# Patient Record
Sex: FEMALE | Race: WHITE | NOT HISPANIC OR LATINO | Employment: FULL TIME | URBAN - NONMETROPOLITAN AREA
[De-identification: names, ages, dates, MRNs, and addresses within clinical notes are randomized per-mention and may not be internally consistent; named-entity substitution may affect disease eponyms.]

---

## 2019-05-06 ENCOUNTER — OFFICE VISIT (OUTPATIENT)
Dept: URGENT CARE | Facility: PHYSICIAN GROUP | Age: 19
End: 2019-05-06

## 2019-05-06 VITALS
RESPIRATION RATE: 16 BRPM | DIASTOLIC BLOOD PRESSURE: 72 MMHG | OXYGEN SATURATION: 99 % | WEIGHT: 153 LBS | SYSTOLIC BLOOD PRESSURE: 108 MMHG | HEART RATE: 68 BPM | TEMPERATURE: 98.9 F

## 2019-05-06 DIAGNOSIS — J01.00 ACUTE MAXILLARY SINUSITIS, RECURRENCE NOT SPECIFIED: ICD-10-CM

## 2019-05-06 PROCEDURE — 99204 OFFICE O/P NEW MOD 45 MIN: CPT | Performed by: PHYSICIAN ASSISTANT

## 2019-05-06 RX ORDER — AMOXICILLIN AND CLAVULANATE POTASSIUM 875; 125 MG/1; MG/1
1 TABLET, FILM COATED ORAL 2 TIMES DAILY
Qty: 20 TAB | Refills: 0 | Status: SHIPPED | OUTPATIENT
Start: 2019-05-06 | End: 2019-07-07

## 2019-05-07 NOTE — PROGRESS NOTES
Chief Complaint   Patient presents with   • Nasal Congestion     x 1 week    • Cough   • Sinus Pain       HISTORY OF PRESENT ILLNESS: Patient is a 18 y.o. female who presents today for the following:    Cough x 1 week  + ear pain, bright green nasal drainage bilaterally - had sinus surgery January 10th; pain in both cheeks  + white sputum production  OTC meds tried: cold meds, not helping     There are no active problems to display for this patient.      Allergies:Latex    Current Outpatient Prescriptions Ordered in [facility]   Medication Sig Dispense Refill   • amoxicillin-clavulanate (AUGMENTIN) 875-125 MG Tab Take 1 Tab by mouth 2 times a day. 20 Tab 0     No current Epic-ordered facility-administered medications on file.        No past medical history on file.    Social History   Substance Use Topics   • Smoking status: Never Smoker   • Smokeless tobacco: Never Used   • Alcohol use Not on file       No family status information on file.   No family history on file.    Review of Systems:    Constitutional ROS: No unexpected change in weight, No weakness, No fatigue  Eye ROS: No recent significant change in vision, No eye pain, redness, discharge  Ear ROS: No drainage, No tinnitus or vertigo, No recent change in hearing  Mouth/Throat ROS: No teeth or gum problems, No bleeding gums, No tongue complaints  Neck ROS: No swollen glands, No significant pain in neck  Pulmonary ROS: No chronic cough, sputum, or hemoptysis, No dyspnea on exertion, No wheezing  Cardiovascular ROS: No diaphoresis, No edema, No palpitations  Gastrointestinal ROS: No change in bowel habits, No significant change in appetite, No nausea, vomiting, diarrhea, or constipation  Musculoskeletal/Extremities ROS: No peripheral edema, No pain, redness or swelling on the joints  Hematologic/Lymphatic ROS: No chills, No night sweats, No weight loss  Skin/Integumentary ROS: No edema, No evidence of rash, No itching      Exam:  /72   Pulse 68   Temp 37.2  °C (98.9 °F) (Temporal)   Resp 16   Wt 69.4 kg (153 lb)   SpO2 99%   General: Well developed, well nourished. No distress.  Eye: PERRL/EOMI; conjunctivae clear, lids normal.  ENMT: Lips without lesions, MMM. Oropharynx is clear. Bilateral TMs are within normal limits.  Pulmonary: Unlabored respiratory effort. Lungs clear to auscultation, no wheezes, no rhonchi.  Cardiovascular: Regular rate and rhythm without murmur.   Neurologic: Grossly nonfocal. No facial asymmetry noted.  Lymph: No cervical lymphadenopathy noted.  Skin: Warm, dry, good turgor. No rashes in visible areas.   Psych: Normal mood. Alert and oriented x3. Judgment and insight is normal.    Assessment/Plan:  Use all medication as directed. Discussed appropriate over-the-counter symptomatic medication, and when to return to clinic. Follow up for worsening or persistent symptoms.  1. Acute maxillary sinusitis, recurrence not specified  amoxicillin-clavulanate (AUGMENTIN) 875-125 MG Tab

## 2019-07-07 ENCOUNTER — APPOINTMENT (OUTPATIENT)
Dept: RADIOLOGY | Facility: IMAGING CENTER | Age: 19
End: 2019-07-07
Attending: PHYSICIAN ASSISTANT
Payer: MEDICAID

## 2019-07-07 ENCOUNTER — OFFICE VISIT (OUTPATIENT)
Dept: URGENT CARE | Facility: PHYSICIAN GROUP | Age: 19
End: 2019-07-07
Payer: MEDICAID

## 2019-07-07 VITALS
HEART RATE: 78 BPM | WEIGHT: 152 LBS | TEMPERATURE: 98.1 F | DIASTOLIC BLOOD PRESSURE: 76 MMHG | RESPIRATION RATE: 16 BRPM | OXYGEN SATURATION: 100 % | SYSTOLIC BLOOD PRESSURE: 118 MMHG

## 2019-07-07 DIAGNOSIS — S50.02XA CONTUSION OF LEFT ELBOW, INITIAL ENCOUNTER: ICD-10-CM

## 2019-07-07 PROCEDURE — 99214 OFFICE O/P EST MOD 30 MIN: CPT | Performed by: PHYSICIAN ASSISTANT

## 2019-07-07 PROCEDURE — 73080 X-RAY EXAM OF ELBOW: CPT | Mod: LT | Performed by: FAMILY MEDICINE

## 2019-07-07 NOTE — PROGRESS NOTES
Chief Complaint   Patient presents with   • Arm Injury     L arm can't bend it        HISTORY OF PRESENT ILLNESS: Patient is a 18 y.o. female who presents today for the following:    Patient comes in for evaluation of left elbow pain.  Her significant other was on her back last night when they both fell forward, landing on her left elbow.  She has almost full range of motion but pain with any flexion or extension.  The bulk of the pain is around the elbow joint.  She does report intermittent distal paresthesias in her left hand.  She has not taken any over-the-counter medication.  Her LMP was around a month ago.    There are no active problems to display for this patient.      Allergies:Latex    No current [employer]-ordered outpatient prescriptions on file.     No current [employer]-ordered facility-administered medications on file.        No past medical history on file.    Social History   Substance Use Topics   • Smoking status: Never Smoker   • Smokeless tobacco: Never Used   • Alcohol use Not on file       No family status information on file.   No family history on file.    Review of Systems:    Constitutional ROS: No unexpected change in weight, No weakness, No fatigue  Eye ROS: No recent significant change in vision, No eye pain, redness, discharge  Ear ROS: No drainage, No tinnitus or vertigo, No recent change in hearing  Mouth/Throat ROS: No teeth or gum problems, No bleeding gums, No tongue complaints  Neck ROS: No swollen glands, No significant pain in neck  Pulmonary ROS: No chronic cough, sputum, or hemoptysis, No dyspnea on exertion, No wheezing  Cardiovascular ROS: No diaphoresis, No edema, No palpitations  Gastrointestinal ROS: No change in bowel habits, No significant change in appetite, No nausea, vomiting, diarrhea, or constipation  Musculoskeletal/Extremities ROS: Positive for left elbow pain.  Hematologic/Lymphatic ROS: No chills, No night sweats, No weight loss  Skin/Integumentary ROS: No edema, No  evidence of rash, No itching      Exam:  /76   Pulse 78   Temp 36.7 °C (98.1 °F) (Temporal)   Resp 16   Wt 68.9 kg (152 lb)   SpO2 100%   General: Well developed, well nourished. No distress.  Pulmonary: Unlabored respiratory effort.  Extremities: Generalized tenderness of the left elbow with no obvious edema.  No ecchymosis is noted.  Almost full range of motion but pain with any range of motion.  Strong radial pulse.  Neurologic: Grossly nonfocal. No facial asymmetry noted.  Skin: Warm, dry, good turgor. No rashes in visible areas.   Psych: Normal mood. Alert and oriented x3. Judgment and insight is normal.    HCG: Unable to leave urine    X-ray, per radiology:  Impression       Negative LEFT elbow series.         Assessment/Plan:  Discussed negative x-ray with the patient.  Would consider repeating x-ray in the next 10 to 12 days if symptoms do not improve to rule out occult fracture.  Sling provided for comfort but encourage patient to do range of motion exercises throughout the day.  Recommend ibuprofen/acetaminophen as needed for pain.  Follow-up for any worsening symptoms   1. Contusion of left elbow, initial encounter  HCG QUANTITATIVE    DX-ELBOW-COMPLETE 3+ LEFT    CANCELED: DX-ELBOW-LIMITED 2- LEFT

## 2019-07-24 ENCOUNTER — NON-PROVIDER VISIT (OUTPATIENT)
Dept: URGENT CARE | Facility: PHYSICIAN GROUP | Age: 19
End: 2019-07-24

## 2019-07-24 DIAGNOSIS — Z02.1 PRE-EMPLOYMENT DRUG SCREENING: ICD-10-CM

## 2019-07-24 LAB
AMP AMPHETAMINE: NORMAL
COC COCAINE: NORMAL
INT CON NEG: NEGATIVE
INT CON POS: POSITIVE
MET METHAMPHETAMINES: NORMAL
OPI OPIATES: NORMAL
PCP PHENCYCLIDINE: NORMAL
POC DRUG COMMENT 753798-OCCUPATIONAL HEALTH: NEGATIVE
THC: NORMAL

## 2019-07-24 PROCEDURE — 80305 DRUG TEST PRSMV DIR OPT OBS: CPT | Performed by: PHYSICIAN ASSISTANT

## 2020-05-07 ENCOUNTER — OFFICE VISIT (OUTPATIENT)
Dept: URGENT CARE | Facility: PHYSICIAN GROUP | Age: 20
End: 2020-05-07
Payer: COMMERCIAL

## 2020-05-07 VITALS
RESPIRATION RATE: 18 BRPM | HEART RATE: 71 BPM | OXYGEN SATURATION: 99 % | DIASTOLIC BLOOD PRESSURE: 76 MMHG | BODY MASS INDEX: 22.22 KG/M2 | TEMPERATURE: 98.6 F | SYSTOLIC BLOOD PRESSURE: 130 MMHG | HEIGHT: 69 IN | WEIGHT: 150 LBS

## 2020-05-07 DIAGNOSIS — M54.41 ACUTE RIGHT-SIDED LOW BACK PAIN WITH RIGHT-SIDED SCIATICA: ICD-10-CM

## 2020-05-07 PROCEDURE — 99203 OFFICE O/P NEW LOW 30 MIN: CPT | Performed by: NURSE PRACTITIONER

## 2020-05-07 RX ORDER — CYCLOBENZAPRINE HCL 5 MG
5 TABLET ORAL 2 TIMES DAILY PRN
Qty: 30 TAB | Refills: 0 | Status: SHIPPED | OUTPATIENT
Start: 2020-05-07 | End: 2020-05-29

## 2020-05-07 ASSESSMENT — ENCOUNTER SYMPTOMS
BACK PAIN: 1
BOWEL INCONTINENCE: 0
LEG PAIN: 1
NECK PAIN: 0
FEVER: 0
PARESIS: 0
PERIANAL NUMBNESS: 0
SENSORY CHANGE: 0
FOCAL WEAKNESS: 0
WEAKNESS: 0
HEADACHES: 0
TINGLING: 0
PARESTHESIAS: 0
NUMBNESS: 0
CHILLS: 0
DIZZINESS: 0

## 2020-05-07 NOTE — PROGRESS NOTES
Subjective:     Nancy Nesbitt  is a 19 y.o. female who presents for Back Pain (hurt (R) lower back when her dog jerked her forward last week, pain radiates down leg )       Back Pain   This is a new problem. The current episode started in the past 7 days. The problem occurs constantly. The problem has been gradually worsening since onset. The pain is present in the lumbar spine. The quality of the pain is described as shooting. The pain radiates to the right thigh. The pain is at a severity of 7/10. The pain is severe. The pain is the same all the time. The symptoms are aggravated by position, standing, sitting and twisting. Associated symptoms include leg pain. Pertinent negatives include no bladder incontinence, bowel incontinence, fever, headaches, numbness, paresis, paresthesias, perianal numbness, tingling or weakness. (19-year-old female patient reports urgent care for new problem that started approximately 6 days ago.  Patient states she was walking her pit ball and turned to the left and her dog saw a bird and pulled her in the opposite direction.  Patient has been using heat, ice and rest with no relief of symptoms.  Patient denies any numbness or tingling or changes in bowel or bladder habits.) She has tried heat and ice for the symptoms. The treatment provided no relief.     Review of Systems   Constitutional: Negative for chills, fever and malaise/fatigue.   Gastrointestinal: Negative for bowel incontinence.   Genitourinary: Negative for bladder incontinence.   Musculoskeletal: Positive for back pain. Negative for joint pain and neck pain.   Neurological: Negative for dizziness, tingling, sensory change, focal weakness, weakness, numbness, headaches and paresthesias.     History reviewed. No pertinent past medical history. History reviewed. No pertinent surgical history.   Social History     Socioeconomic History   • Marital status: Single     Spouse name: Not on file   • Number of children: Not on  "file   • Years of education: Not on file   • Highest education level: Not on file   Occupational History   • Not on file   Social Needs   • Financial resource strain: Not on file   • Food insecurity     Worry: Not on file     Inability: Not on file   • Transportation needs     Medical: Not on file     Non-medical: Not on file   Tobacco Use   • Smoking status: Never Smoker   • Smokeless tobacco: Never Used   Substance and Sexual Activity   • Alcohol use: Not on file   • Drug use: Not on file   • Sexual activity: Not on file   Lifestyle   • Physical activity     Days per week: Not on file     Minutes per session: Not on file   • Stress: Not on file   Relationships   • Social connections     Talks on phone: Not on file     Gets together: Not on file     Attends [employer] service: Not on file     Active member of club or organization: Not on file     Attends meetings of clubs or organizations: Not on file     Relationship status: Not on file   • Intimate partner violence     Fear of current or ex partner: Not on file     Emotionally abused: Not on file     Physically abused: Not on file     Forced sexual activity: Not on file   Other Topics Concern   • Not on file   Social History Narrative   • Not on file    Latex     Objective:   /76   Pulse 71   Temp 37 °C (98.6 °F)   Resp 18   Ht 1.753 m (5' 9\")   Wt 68 kg (150 lb)   SpO2 99%   BMI 22.15 kg/m²   Physical Exam  Vitals signs reviewed.   Constitutional:       Appearance: Normal appearance.   Cardiovascular:      Rate and Rhythm: Normal rate and regular rhythm.      Heart sounds: Normal heart sounds.   Pulmonary:      Effort: Pulmonary effort is normal.      Breath sounds: Normal breath sounds.   Musculoskeletal:        Back:       Comments: Pain with dorsiflexion of right foot, positive SLR on right. Negative Fabers. No bony tenderness, step off or deformity.    Skin:     Capillary Refill: Capillary refill takes less than 2 seconds.   Neurological:      " Mental Status: She is alert and oriented to person, place, and time.   Psychiatric:         Mood and Affect: Mood normal.         Behavior: Behavior normal.         Thought Content: Thought content normal.         Judgment: Judgment normal.          Assessment/Plan:     1. Acute right-sided low back pain with right-sided sciatica  - cyclobenzaprine (FLEXERIL) 5 MG tablet; Take 1 Tab by mouth 2 times a day as needed for Moderate Pain for up to 30 doses.  Dispense: 30 Tab; Refill: 0  - Diclofenac Sodium 1 % Gel; Apply 1 Application to skin as directed 2 Times a Day for 7 days.  Dispense: 1 Tube; Refill: 0    Discussed physical examination findings as well as patient presentation and mechanism of injury is consistent with a possible muscle strain or spasm.  Provided patient with Flexeril as well as Voltaren gel to use.  Patient may additionally take over-the-counter NSAIDs and Tylenol per 's instructions use heat, ice and gentle exercises.  Instructed patient to follow-up in clinic in 7 days if symptoms persist or worsen     Supportive care, differential diagnoses, and indications for immediate follow-up discussed with patient.    Pathogenesis of diagnosis discussed including typical length and natural progression. Patient expresses understanding and agrees to plan.    Instructed patient to return to clinic for worsening symptoms or symptoms that persist for 7 to 10 days     Please note that this dictation was created using voice recognition software. I have made every reasonable attempt to correct obvious errors, but I expect that there are errors of grammar and possibly content that I did not discover before finalizing the note.

## 2020-05-26 ENCOUNTER — OCCUPATIONAL MEDICINE (OUTPATIENT)
Dept: URGENT CARE | Facility: PHYSICIAN GROUP | Age: 20
End: 2020-05-26
Payer: COMMERCIAL

## 2020-05-26 VITALS
RESPIRATION RATE: 16 BRPM | TEMPERATURE: 98.7 F | BODY MASS INDEX: 22.22 KG/M2 | SYSTOLIC BLOOD PRESSURE: 120 MMHG | OXYGEN SATURATION: 98 % | WEIGHT: 150 LBS | HEART RATE: 68 BPM | DIASTOLIC BLOOD PRESSURE: 78 MMHG | HEIGHT: 69 IN

## 2020-05-26 DIAGNOSIS — M54.16 LUMBAR RADICULOPATHY: ICD-10-CM

## 2020-05-26 DIAGNOSIS — S39.012A ACUTE MYOFASCIAL STRAIN OF LUMBAR REGION, INITIAL ENCOUNTER: ICD-10-CM

## 2020-05-26 PROCEDURE — 99214 OFFICE O/P EST MOD 30 MIN: CPT | Mod: 29 | Performed by: PHYSICIAN ASSISTANT

## 2020-05-26 NOTE — LETTER
"EMPLOYEE’S CLAIM FOR COMPENSATION/ REPORT OF INITIAL TREATMENT  FORM C-4    EMPLOYEE’S CLAIM - PROVIDE ALL INFORMATION REQUESTED   First Name  Nancy Last Name  Laz Birthdate                    2000                Sex  female Claim Number   Home Address  [address] [place] Age  19 y.o. Height  1.753 m (5' 9\") Weight  68 kg (150 lb) [facility]     [place]  [address] Telephone  [phone] (home)    Mailing Address  [address] [place] Zip  [address] Primary Language Spoken  English    Insurer   Third Party   [employer]   Employee's Occupation (Job Title) When Injury or Occupational Disease Occurred  Production    Employer's Name  [employer]  Telephone  [phone]    Employer Address  [address]  Zip  [address]    Date of Injury  5/26/2020               Hour of Injury  8:30 AM Date Employer Notified  5/26/2020 Last Day of Work after Injury or Occupational Disease  5/26/2020 Supervisor to Whom Injury Reported  Laly   Address or Location of Accident (if applicable)  [[employer] ]   What were you doing at the time of accident? (if applicable)  Pulling Picture frames down line    How did this injury or occupational disease occur? (Be specific an answer in detail. Use additional sheet if necessary)  I was pulling a picture frame down the line and routed wrong    If you believe that you have an occupational disease, when did you first have knowledge of the disability and it relationship to your employment?  N/A Witnesses to the Accident  [place]      Nature of Injury or Occupational Disease  Workers' Compensation  Part(s) of Body Injured or Affected  Lower Back Area (Lumbar Area & Lumbo-Sacral), N/A, N/A    I certify that the above is true and correct to the best of my knowledge and that I have provided this information in order to obtain the benefits of " [place]  I hereby authorize any physician, chiropractor, surgeon, practitioner, or other person, any hospital, including [facility] or [facility], any medical service organization, any insurance company, or other institution or organization to release to each other, any medical or other information, including benefits paid or payable, pertinent to this injury or disease, except information relative to diagnosis, treatment and/or counseling for AIDS, psychological conditions, alcohol or controlled substances, for which I must give specific authorization.  A Photostat of this authorization shall be as valid as the original.     Date 05/26/2020   Place [facility]   Employee’s Signature   THIS REPORT MUST BE COMPLETED AND MAILED WITHIN 3 WORKING DAYS OF TREATMENT   Place  [facility] [place]  Name of Facility  [facility]   Date  5/26/2020 Diagnosis  (S39.012A) Acute myofascial strain of lumbar region, initial encounter  (M54.16) Lumbar radiculopathy Is there evidence the injured employee was under the influence of alcohol and/or another controlled substance at the time of accident?   Hour  11:34 AM Description of Injury or Disease  Diagnoses of Acute myofascial strain of lumbar region, initial encounter and Lumbar radiculopathy were pertinent to this visit. No   Treatment  Suspect muscle strain affecting lumbar dermatome.  Discussed appropriate dosing of ibuprofen/acetaminophen as needed for pain.  May use ice, heat, and over-the-counter muscle rubs as needed for additional pain relief.  Refer to D 39 for restrictions.  Return to clinic 5/29/2020 for reevaluation, sooner for any significant changes in symptoms.  1. Acute myofascial strain of lumbar region, initial encounter    2. Lumbar radiculopathy        Have you advised the patient to remain off work five days or more? No   X-Ray  "Findings      If Yes   From Date  To Date      From information given by the employee, together with medical evidence, can you directly connect this injury or occupational disease as job incurred?  Yes If No Full Duty No Modified Duty  Yes   Is additional medical care by a physician indicated?  Yes If Modified Duty, Specify any Limitations / Restrictions  See D39   Do you know of any previous injury or disease contributing to this condition or occupational disease?                            Yes  Comments:see chart note   Date  5/26/2020 Print Doctor’s Name Marisa Dunn P.A.-C. I certify the employer’s copy of  this form was mailed on:   Address  [address] Insurer’s Use Only     [place]  [address]    Provider’s Tax ID Number  [id number] Telephone  Dept: [phone]        e-MARISA Montes De Oca P.A.-C.   e-Signature: Dr. Don Barnett,   Medical Director Degree  MD        ORIGINAL-TREATING PHYSICIAN OR CHIROPRACTOR    PAGE 2-INSURER/TPA    PAGE 3-EMPLOYER    PAGE 4-EMPLOYEE             Form C-4 (rev.10/07)              BRIEF DESCRIPTION OF RIGHTS AND BENEFITS  (Pursuant to [place] 616[place])    Notice of Injury or Occupational Disease (Incident Report Form C-1): If an injury or occupational disease (OD) arises out of and in the course of employment, you must provide written notice to your employer as soon as practicable, but no later than 7 days after the accident or OD. Your employer shall maintain a sufficient supply of the required forms.    Claim for Compensation (Form C-4): If medical treatment is sought, the form C-4 is available at the place of initial treatment. A completed \"Claim for Compensation\" (Form C-4) must be filed within 90 days after an accident or OD. The treating physician or chiropractor must, within 3 working days after treatment, complete and mail to the employer, the employer's insurer and third-party , the Claim for Compensation.    Medical " Treatment: If you require medical treatment for your on-the-job injury or OD, you may be required to select a physician or chiropractor from a list provided by your workers’ compensation insurer, if it has contracted with an Organization for Managed Care (MCO) or Preferred Provider Organization (PPO) or providers of health care. If your employer has not entered into a contract with an MCO or PPO, you may select a physician or chiropractor from the Panel of Physicians and Chiropractors. Any medical costs related to your industrial injury or OD will be paid by your insurer.    Temporary Total Disability (TTD): If your doctor has certified that you are unable to work for a period of at least 5 consecutive days, or 5 cumulative days in a 20-day period, or places restrictions on you that your employer does not accommodate, you may be entitled to TTD compensation.    Temporary Partial Disability (TPD): If the wage you receive upon reemployment is less than the compensation for TTD to which you are entitled, the insurer may be required to pay you TPD compensation to make up the difference. TPD can only be paid for a maximum of 24 months.    Permanent Partial Disability (PPD): When your medical condition is stable and there is an indication of a PPD as a result of your injury or OD, within 30 days, your insurer must arrange for an evaluation by a rating physician or chiropractor to determine the degree of your PPD. The amount of your PPD award depends on the date of injury, the results of the PPD evaluation and your age and wage.    Permanent Total Disability (PTD): If you are medically certified by a treating physician or chiropractor as permanently and totally disabled and have been granted a PTD status by your insurer, you are entitled to receive monthly benefits not to exceed 66 2/3% of your average monthly wage. The amount of your PTD payments is subject to reduction if you previously received a PPD  award.    Vocational Rehabilitation Services: You may be eligible for vocational rehabilitation services if you are unable to return to the job due to a permanent physical impairment or permanent restrictions as a result of your injury or occupational disease.    Transportation and Per Micaela Reimbursement: You may be eligible for travel expenses and per micaela associated with medical treatment.    Reopening: You may be able to reopen your claim if your condition worsens after claim closure.    Appeal Process: If you disagree with a written determination issued by the insurer or the insurer does not respond to your request, you may appeal to the Department of Administration, , by following the instructions contained in your determination letter. You must appeal the determination within 70 days from the date of the determination letter at [address], [address], [place], [place] [address], or [address], [place], [place] [address]. If you disagree with the  decision, you may appeal to the Department of Administration, . You must file your appeal within 30 days from the date of the  decision letter at [address], [address], [place], [place] [address], or [address], [place], [place] [address]. If you disagree with a decision of an , you may file a petition for judicial review with the District Court. You must do so within 30 days of the Appeal Officer’s decision. You may be represented by an  at your own expense or you may contact the [facility] for possible representation.    [place]  for Injured Workers (NAIW): If you disagree with a  decision, you may request that NAIW represent you without charge at an  Hearing. For information regarding denial of benefits, you may contact the [facility] at: [address], [address], [place] [address], [phone]  [phone], or [address], [address], [place] [address], [phone]    To File a Complaint with the Division: If you wish to file a complaint with the  of the Division of Industrial Relations (DIR),  please contact the Workers’ Compensation Section, [address], [address], [place], [place] [address], telephone (095) [phone], or [address], [address], [place], [place] [address], telephone [phone].    For assistance with Workers’ Compensation Issues: You may contact the Office of the Governor Consumer Health Assistance, [address] [place] [address], [address], [place], [place] [address], Toll Free 1-886.312.4411, Web site: http://C4M.[facility].nv., E-mail [email or web address].                   __________________________________________________________________                                                     _05/26/2020________        Employee Name / Signature                                                                                                                                              Date                                                                                                                                                                                                     D-2 (rev. 06/18)

## 2020-05-26 NOTE — PROGRESS NOTES
"Chief Complaint   Patient presents with   • Back Pain     WC new-sciatica pain pinched nerve at work again (R) side-was pulling something at a angle and felt a pop        HISTORY OF PRESENT ILLNESS: Patient is a 19 y.o. female who presents today for the following:    DOI: 5/26/2020 around 0830 hrs  JAMSHID: Reaching, pulling from her right to left, an object weight around 150-200#, felt a pop in the right low back, feeling immediate pain. Radiates sharp pain slightly up the right side of the mid back. Radiates burning pain lateral aspect right upper leg, stopping at the knee. Denies saddle anesthesia, bowel/bladder incontinence/retention, extremity weakness. OTC meds tried: none. Back history: Was evaluated 5/7/2020 for right-sided low back pain when her dog jerked her forward.  Reportedly had radiating pain to the right thigh. Pain resolved 4 days after clinic visit.    Allergies:Latex    PMH: No pertinent past medical history to this problem  MEDS: Medications were reviewed in Epic  ALLERGIES: Allergies were reviewed in Epic  SOCHX: Works at [employer]  FH: No pertinent family history to this problem    Review of Systems:    Constitutional ROS: No unexpected change in weight, No weakness, No fatigue  Musculoskeletal/Extremities ROS: right sided low back pain.  Skin/Integumentary ROS: No edema, No evidence of rash, No itching      Exam:  /78   Pulse 68   Temp 37.1 °C (98.7 °F)   Resp 16   Ht 1.753 m (5' 9\")   Wt 68 kg (150 lb)   SpO2 98%   General: Well developed, well nourished. No distress.  Pulmonary: Unlabored respiratory effort.   Back: Slight decreased range of motion in all planes due to pain.  Antalgic gait noted.  Extremities: No motor or sensory deficit noted in the bilateral lower extremities.  Strong prepatellar and Achilles DTRs noted bilaterally.  Strong plantar flexion/dorsiflexion.  Neurologic: Grossly nonfocal. No facial asymmetry noted.  Skin: Warm, dry, good turgor. No rashes in visible areas. "   Psych: Normal mood. Alert and oriented x3. Judgment and insight is normal.    Assessment/Plan:  Suspect muscle strain affecting lumbar dermatome.  Discussed appropriate dosing of ibuprofen/acetaminophen as needed for pain.  May use ice, heat, and over-the-counter muscle rubs as needed for additional pain relief.  Refer to D 39 for restrictions.  Return to clinic 5/29/2020 for reevaluation, sooner for any significant changes in symptoms.  1. Acute myofascial strain of lumbar region, initial encounter     2. Lumbar radiculopathy

## 2020-05-26 NOTE — LETTER
[address] [place] [address]  Phone:  [phone] - Fax:  [phone]   [facility] Progress Report and Disability Certification  Date of Service: 5/26/2020   No Show:  No  Date / Time of Next Visit: 5/29/2020   Claim Information   Patient Name: Nancy Nesbitt  Claim Number:     Employer: [employer]  Date of Injury: 5/26/2020     Insurer / TPA: [employer]  ID / SSN:     Occupation: Production  Diagnosis: Diagnoses of Acute myofascial strain of lumbar region, initial encounter and Lumbar radiculopathy were pertinent to this visit.    Medical Information   Related to Industrial Injury? Yes    Subjective Complaints:  DOI: 5/26/2020 around 0830 hrs  JAMSHID: Reaching, pulling from her right to left, an object weight around 150-200#, felt a pop in the right low back, feeling immediate pain. Radiates sharp pain slightly up the right side of the mid back. Radiates burning pain lateral aspect right upper leg, stopping at the knee. Denies saddle anesthesia, bowel/bladder incontinence/retention, extremity weakness. OTC meds tried: none. Back history: Was evaluated 5/7/2020 for right-sided low back pain when her dog jerked her forward.  Reportedly had radiating pain to the right thigh. Pain resolved 4 days after clinic visit.     Objective Findings: Back: Slight decreased range of motion in all planes due to pain.  Antalgic gait noted.  Extremities: No motor or sensory deficit noted in the bilateral lower extremities.  Strong prepatellar and Achilles DTRs noted bilaterally.  Strong plantar flexion/dorsiflexion.   Pre-Existing Condition(s):     Assessment:   Initial Visit    Status: Additional Care Required  Permanent Disability:No    Plan:      Diagnostics:      Comments:  Suspect muscle strain affecting lumbar dermatome.  Discussed appropriate dosing of ibuprofen/acetaminophen as needed for pain.  May use ice, heat, and over-the-counter  muscle rubs as needed for additional pain relief.  Refer to D 39 for restrictions.    Return to clinic 2020 for reevaluation, sooner for any significant changes in symptoms.  1. Acute myofascial strain of lumbar region, initial encounter    2. Lumbar radiculopathy          Disability Information   Status: Released to Restricted Duty    From:  2020  Through: 2020 Restrictions are: Temporary   Physical Restrictions   Sitting:    Standing:    [place] hrs/day [place] hrs/day   Squattin hrs/day Walking:    Climbing:    Pushing:      Pulling:    Other:    Reaching Above Shoulder (L):   Reaching Above Shoulder (R):       Reaching Below Shoulder (L):    Reaching Below Shoulder (R):      Not to exceed Weight Limits   Carrying(hrs):   Weight Limit(lb): < or = to 10 pounds Lifting(hrs):   Weight  Limit(lb): < or = to 10 pounds   Comments: Pushing, pulling, lifting, or carrying more than 10 pounds    Repetitive Actions   Hands: i.e. Fine Manipulations from Grasping:     Feet: i.e. Operating Foot Controls:     Driving / Operate Machinery:     Physician Name: Marisa Dunn P.A.-C. Physician Signature: MARISA Soliman P.A.-C. e-Signature: Dr. Don Barnett, Medical Director   Clinic Name / Location: [address] [address] Clinic Phone Number: Dept: [phone]   Appointment Time: 11:00 Am Visit Start Time: 11:34 AM   Check-In Time:  11:14 Am Visit Discharge Time: 12:07 PM   Original-Treating Physician or Chiropractor    Page 2-Insurer/TPA    Page 3-Employer    Page 4-Employee

## 2020-05-29 ENCOUNTER — OCCUPATIONAL MEDICINE (OUTPATIENT)
Dept: URGENT CARE | Facility: PHYSICIAN GROUP | Age: 20
End: 2020-05-29
Payer: COMMERCIAL

## 2020-05-29 VITALS
SYSTOLIC BLOOD PRESSURE: 116 MMHG | OXYGEN SATURATION: 98 % | DIASTOLIC BLOOD PRESSURE: 68 MMHG | RESPIRATION RATE: 16 BRPM | HEART RATE: 64 BPM | TEMPERATURE: 98.7 F

## 2020-05-29 DIAGNOSIS — S39.012D ACUTE MYOFASCIAL STRAIN OF LUMBAR REGION, SUBSEQUENT ENCOUNTER: ICD-10-CM

## 2020-05-29 PROCEDURE — 99214 OFFICE O/P EST MOD 30 MIN: CPT | Performed by: FAMILY MEDICINE

## 2020-05-29 RX ORDER — CYCLOBENZAPRINE HCL 5 MG
TABLET ORAL
Qty: 21 TAB | Refills: 0 | Status: SHIPPED | OUTPATIENT
Start: 2020-05-29 | End: 2020-06-07

## 2020-05-29 RX ORDER — PREDNISONE 20 MG/1
TABLET ORAL
Qty: 9 TAB | Refills: 0 | Status: SHIPPED | OUTPATIENT
Start: 2020-05-29 | End: 2020-06-07

## 2020-05-29 NOTE — LETTER
[address] [place] [address]  Phone:  [phone] - Fax:  [phone]   [facility] Progress Report and Disability Certification  Date of Service: 5/29/2020   No Show:  No  Date / Time of Next Visit: 6/7/2020   Claim Information   Patient Name: Nancy Nesbitt  Claim Number:     Employer: [employer]  Date of Injury: 5/26/2020     Insurer / TPA: [employer]  ID / SSN:     Occupation: Production  Diagnosis: The encounter diagnosis was Acute myofascial strain of lumbar region, subsequent encounter.    Medical Information   Related to Industrial Injury? Yes    Subjective Complaints:  DOI: 5/26/2020. Compared to visit 5/26/2020, lower back pain is same. No more pain down legs. Feels a tender knot in right lower back. Using Tylenol with temporary help. Using previously prescribed Diclofenac 1% gel which helps some temporarily. Went to work with restrictions, but it aggravated the back pain.   Objective Findings: Right lower back: tender probable muscle knot to palpation. Moderately decreased lumbar range of motion due to pain. Tenderness to palpation right lower and midline lumbar region.   Pre-Existing Condition(s):     Assessment:   Condition Same  Comments:Back pain is same, but no more pain down legs.    Status: Additional Care Required  Comments:Return on 6/7/2020 or sooner if needed.  Permanent Disability:No    Plan: Medication  Comments:Prednisone course prescribed for anti-inflammatory effect. Cyclobenazprine muscle relaxer prescribed to use as needed. May continue with Diclofenac gel and Tylenol as needed.    Diagnostics:      Comments:       Disability Information   Status: Released to Restricted Duty    From:  5/29/2020  Through: 6/7/2020 Restrictions are: Temporary   Physical Restrictions   Sitting:    Standing:    Stooping:    Bending:      Squatting:    Walking:    Climbing:    Pushing:      Pulling:    Other:    Reaching  Above Shoulder (L):   Reaching Above Shoulder (R):       Reaching Below Shoulder (L):    Reaching Below Shoulder (R):      Not to exceed Weight Limits   Carrying(hrs):   Weight Limit(lb):   Lifting(hrs):   Weight  Limit(lb):     Comments: Sedentary work only.    Repetitive Actions   Hands: i.e. Fine Manipulations from Grasping:     Feet: i.e. Operating Foot Controls:     Driving / Operate Machinery:     Physician Name: Willie Cruz M.D. Physician Signature: WILLIE Jackson M.D. e-Signature: Dr. Don Barnett, Medical Director   Clinic Name / Location: [address] [address] Clinic Phone Number: Dept: [phone]   Appointment Time: 6:00 Pm Visit Start Time: 5:51 PM   Check-In Time:  5:37 Pm Visit Discharge Time: 6:18 PM   Original-Treating Physician or Chiropractor    Page 2-Insurer/TPA    Page 3-Employer    Page 4-Employee

## 2020-05-30 NOTE — PROGRESS NOTES
Chief Complaint:    Chief Complaint   Patient presents with   • Follow-Up     WC fv-feeling overall better but with movement starting to feel sore and in pain        History of Present Illness:    DOI: 5/26/2020. Compared to visit 5/26/2020, lower back pain is same. No more pain down legs. Feels a tender knot in right lower back. Using Tylenol with temporary help. Using previously prescribed Diclofenac 1% gel which helps some temporarily. Went to work with restrictions, but it aggravated the back pain.      Review of Systems:    Constitutional: Negative for fever, chills, and diaphoresis.   Eyes: Negative for change in vision, photophobia, pain, redness, and discharge.  ENT: Negative for ear pain, ear discharge, hearing loss, tinnitus, nasal congestion, nosebleeds, and sore throat.    Respiratory: Negative for cough, hemoptysis, sputum production, shortness of breath, wheezing, and stridor.    Cardiovascular: Negative for chest pain, palpitations, orthopnea, claudication, leg swelling, and PND.   Gastrointestinal: Negative for abdominal pain, nausea, vomiting, diarrhea, constipation, blood in stool, and melena.   Genitourinary: Negative for dysuria, urinary urgency, urinary frequency, hematuria, and flank pain.   Musculoskeletal: See HPI.  Skin: Negative for rash and itching.   Neurological: Negative for dizziness, tingling, tremors, sensory change, speech change, focal weakness, seizures, loss of consciousness, and headaches.   Endo: Negative for polydipsia.   Heme: Does not bruise/bleed easily.   Psychiatric/Behavioral: Negative for depression, suicidal ideas, hallucinations, memory loss and substance abuse. The patient is not nervous/anxious and does not have insomnia.        Past Medical History:    History reviewed. No pertinent past medical history.    Past Surgical History:    History reviewed. No pertinent surgical history.    Social History:    Social History     Socioeconomic History   • Marital status:  Single     Spouse name: Not on file   • Number of children: Not on file   • Years of education: Not on file   • Highest education level: Not on file   Occupational History   • Not on file   Social Needs   • Financial resource strain: Not on file   • Food insecurity     Worry: Not on file     Inability: Not on file   • Transportation needs     Medical: Not on file     Non-medical: Not on file   Tobacco Use   • Smoking status: Never Smoker   • Smokeless tobacco: Never Used   Substance and Sexual Activity   • Alcohol use: Not on file   • Drug use: Not on file   • Sexual activity: Not on file   Lifestyle   • Physical activity     Days per week: Not on file     Minutes per session: Not on file   • Stress: Not on file   Relationships   • Social connections     Talks on phone: Not on file     Gets together: Not on file     Attends [employer] service: Not on file     Active member of club or organization: Not on file     Attends meetings of clubs or organizations: Not on file     Relationship status: Not on file   • Intimate partner violence     Fear of current or ex partner: Not on file     Emotionally abused: Not on file     Physically abused: Not on file     Forced sexual activity: Not on file   Other Topics Concern   • Not on file   Social History Narrative   • Not on file     Family History:    History reviewed. No pertinent family history.    Medications:    No current outpatient medications on file prior to visit.     No current facility-administered medications on file prior to visit.      Allergies:    Allergies   Allergen Reactions   • Latex      Hives        Vitals:    Vitals:    05/29/20 1753   BP: 116/68   Pulse: 64   Resp: 16   Temp: 37.1 °C (98.7 °F)   SpO2: 98%       Physical Exam:    Constitutional: Vital signs reviewed. Appears well-developed and well-nourished. No acute distress.   Eyes: Sclera white, conjunctivae clear.  ENT: External ears normal. Hearing normal.  Pulmonary/Chest: Respirations  non-labored.  Musculoskeletal: Right lower back: tender probable muscle knot to palpation. Moderately decreased lumbar range of motion due to pain. Tenderness to palpation right lower and midline lumbar region.  Neurological: Alert and oriented to person, place, and time. Muscle tone normal. Coordination normal. Light touch and sensation normal.   Skin: No rashes or lesions. Warm, dry, normal turgor.  Psychiatric: Normal mood and affect. Behavior is normal. Judgment and thought content normal.       Assessment / Plan:    1. Acute myofascial strain of lumbar region, subsequent encounter  - predniSONE (DELTASONE) 20 MG Tab; 2 TABS BY MOUTH ONCE A DAY ON DAYS 1-3, 1 TAB ONCE A DAY ON DAYS 4-6. TAKE WITH FOOD.  Dispense: 9 Tab; Refill: 0  - cyclobenzaprine (FLEXERIL) 5 MG tablet; 1 TAB BY MOUTH EVERY 8 HOURS ONLY IF NEEDED FOR PAIN, MUSCLE SPASM, AND/OR MUSCLE TIGHTNESS. MAY CAUSE DROWSINESS.  Dispense: 21 Tab; Refill: 0      Discussed with her DDX, management options, and risks, benefits, and alternatives to treatment plan agreed upon.    Work restrictions: Sedentary work only.     Prednisone course prescribed for anti-inflammatory effect. Cyclobenazprine muscle relaxer prescribed to use as needed. May continue with Diclofenac gel and Tylenol as needed.     Return on 6/7/2020 or sooner if needed.

## 2020-06-07 ENCOUNTER — OCCUPATIONAL MEDICINE (OUTPATIENT)
Dept: URGENT CARE | Facility: PHYSICIAN GROUP | Age: 20
End: 2020-06-07
Payer: COMMERCIAL

## 2020-06-07 VITALS
TEMPERATURE: 97.1 F | WEIGHT: 150 LBS | DIASTOLIC BLOOD PRESSURE: 80 MMHG | OXYGEN SATURATION: 99 % | BODY MASS INDEX: 22.22 KG/M2 | HEIGHT: 69 IN | SYSTOLIC BLOOD PRESSURE: 108 MMHG | RESPIRATION RATE: 16 BRPM | HEART RATE: 72 BPM

## 2020-06-07 DIAGNOSIS — S39.012D ACUTE MYOFASCIAL STRAIN OF LUMBAR REGION, SUBSEQUENT ENCOUNTER: ICD-10-CM

## 2020-06-07 PROCEDURE — 99214 OFFICE O/P EST MOD 30 MIN: CPT | Performed by: PHYSICIAN ASSISTANT

## 2020-06-07 ASSESSMENT — PAIN SCALES - GENERAL: PAINLEVEL: 8=MODERATE-SEVERE PAIN

## 2020-06-07 NOTE — PROGRESS NOTES
"Chief Complaint   Patient presents with   • Follow-Up      FV for Lower back pain/ pain is getting worse       HISTORY OF PRESENT ILLNESS: Patient is a 19 y.o. female who presents today for the following:    DOI: 5/26/2020, 3rd visit  JAMSHID: Reaching, pulling from her right to left, an object weight around 150-200#, felt a pop in the right low back, feeling immediate pain.  Seen 5/26/2020 with similar back pain but pain radiating down the legs had resolved.  Continued having aggravated pain at work despite restrictions.  Was given prednisone and cyclobenzaprine. CURRENTLY: Back pain feels the same, no improvement with steroids/muscle relaxer.  Radiating pain has returned, describing it on the anterior right upper leg, stopping mid shin.  Continues to deny saddle anesthesia, bowel/bladder incontinence/retention, extremity weakness.  Continues to have improvement with Voltaren gel.    Allergies:Latex     PMH: No pertinent past medical history to this problem  MEDS: Medications were reviewed in Epic  ALLERGIES: Allergies were reviewed in Epic  SOCHX: Works at [employer]  FH: No pertinent family history to this problem     Review of Systems:    Constitutional ROS: No unexpected change in weight, No weakness, No fatigue  Musculoskeletal/Extremities ROS: right sided low back pain.  Skin/Integumentary ROS: No edema, No evidence of rash, No itching    Exam:  /80   Pulse 72   Temp 36.2 °C (97.1 °F) (Temporal)   Resp 16   Ht 1.753 m (5' 9\")   Wt 68 kg (150 lb)   SpO2 99%   General: Well developed, well nourished. No distress.  Pulmonary: Unlabored respiratory effort.   Back: Slight decreased range of motion in all planes due to pain.  Antalgic gait noted.  Extremities: No motor or sensory deficit noted in the bilateral lower extremities.  Strong prepatellar and Achilles DTRs noted bilaterally.  Strong plantar flexion/dorsiflexion.  Neurologic: Grossly nonfocal. No facial asymmetry noted.  Skin: Warm, dry, good turgor. " No rashes in visible areas.   Psych: Normal mood. Alert and oriented x3. Judgment and insight is normal.    Assessment/Plan:  Refilling Voltaren gel as it seems to be helping.  Referring to physical therapy.  Maintain current restrictions.  Return to clinic in 1 month for reevaluation, sooner for any significant changes in symptoms.  1. Acute myofascial strain of lumbar region, subsequent encounter  Diclofenac Sodium 1 % Gel    REFERRAL TO PHYSICAL THERAPY Reason for Therapy: Eval/Treat/Report

## 2020-06-07 NOTE — LETTER
[address] [place] [address]  Phone:  [phone] - Fax:  [phone]   [facility] Progress Report and Disability Certification  Date of Service: 6/7/2020   No Show:  No  Date / Time of Next Visit: 7/5/2020   Claim Information   Patient Name: Nancy Nesbitt  Claim Number:     Employer: [employer]  Date of Injury: 5/26/2020     Insurer / TPA: [employer]  ID / SSN:     Occupation: Production  Diagnosis: The encounter diagnosis was Acute myofascial strain of lumbar region, subsequent encounter.    Medical Information   Related to Industrial Injury? Yes    Subjective Complaints:  DOI: 5/26/2020, 3rd visit  JAMSHID: Reaching, pulling from her right to left, an object weight around 150-200#, felt a pop in the right low back, feeling immediate pain.  Seen 5/26/2020 with similar back pain but pain radiating down the legs had resolved.  Continued having aggravated pain at work despite restrictions.  Was given prednisone and cyclobenzaprine. CURRENTLY: Back pain feels the same, no improvement with steroids/muscle relaxer.  Radiating pain has returned, describing it on the anterior right upper leg, stopping mid shin.  Continues to deny saddle anesthesia, bowel/bladder incontinence/retention, extremity weakness.  Continues to have improvement with Voltaren gel.     Objective Findings: Back: Slight decreased range of motion in all planes due to pain.  Antalgic gait noted.  Extremities: No motor or sensory deficit noted in the bilateral lower extremities.  Strong prepatellar and Achilles DTRs noted bilaterally.  Strong plantar flexion/dorsiflexion.   Pre-Existing Condition(s):     Assessment:   Condition Same    Status: Additional Care Required  Permanent Disability:No    Plan: MedicationPT    Diagnostics:      Comments:  Assessment/Plan:  Refilling Voltaren gel as it seems to be helping.  Referring to physical therapy.  Maintain current  restrictions.  Return to clinic in 1 month for reevaluation, sooner for any significant changes in symptoms.  1. Acute myofascial st  rain of lumbar region, subsequent encounter  Diclofenac Sodium 1 % Gel   REFERRAL TO PHYSICAL THERAPY Reason for Therapy: Eval/Treat/Report        Disability Information   Status: Released to Restricted Duty    From:  6/7/2020  Through: 7/5/2020 Restrictions are: Temporary   Physical Restrictions   Sitting:    Standing:    Stooping:    Bending:      Squatting:    Walking:    Climbing:    Pushing:      Pulling:    Other:    Reaching Above Shoulder (L):   Reaching Above Shoulder (R):       Reaching Below Shoulder (L):    Reaching Below Shoulder (R):      Not to exceed Weight Limits   Carrying(hrs):   Weight Limit(lb):   Lifting(hrs):   Weight  Limit(lb):     Comments: Maintain previous restrictions    Repetitive Actions   Hands: i.e. Fine Manipulations from Grasping:     Feet: i.e. Operating Foot Controls:     Driving / Operate Machinery:     Physician Name: Marisa Dunn P.A.-C. Physician Signature: MARISA Soliman P.A.-C. e-Signature: Dr. Don Barnett, Medical Director   Clinic Name / Location: [address] [address] Clinic Phone Number: Dept: [phone]   Appointment Time: 9:15 Am Visit Start Time: 9:21 AM   Check-In Time:  9:13 Am Visit Discharge Time: 9:42 AM   Original-Treating Physician or Chiropractor    Page 2-Insurer/TPA    Page 3-Employer    Page 4-Employee

## 2020-06-10 ENCOUNTER — OCCUPATIONAL MEDICINE (OUTPATIENT)
Dept: URGENT CARE | Facility: PHYSICIAN GROUP | Age: 20
End: 2020-06-10
Payer: COMMERCIAL

## 2020-06-10 VITALS
TEMPERATURE: 98.8 F | SYSTOLIC BLOOD PRESSURE: 128 MMHG | HEART RATE: 71 BPM | DIASTOLIC BLOOD PRESSURE: 82 MMHG | OXYGEN SATURATION: 99 % | RESPIRATION RATE: 16 BRPM | HEIGHT: 69 IN | WEIGHT: 146.6 LBS | BODY MASS INDEX: 21.71 KG/M2

## 2020-06-10 DIAGNOSIS — S39.012D ACUTE MYOFASCIAL STRAIN OF LUMBAR REGION, SUBSEQUENT ENCOUNTER: ICD-10-CM

## 2020-06-10 PROCEDURE — 99213 OFFICE O/P EST LOW 20 MIN: CPT | Performed by: PHYSICIAN ASSISTANT

## 2020-06-10 ASSESSMENT — PAIN SCALES - GENERAL: PAINLEVEL: 5=MODERATE PAIN

## 2020-06-10 NOTE — LETTER
[address] [place] [address]  Phone:  [phone] - Fax:  [phone]   [facility] Progress Report and Disability Certification  Date of Service: 6/10/2020   No Show:  No  Date / Time of Next Visit: 7/8/2020   Claim Information   Patient Name: Nancy Nesbitt  Claim Number:     Employer: [employer]  Date of Injury: 5/26/2020     Insurer / TPA: [employer]  ID / SSN:     Occupation: Production  Diagnosis: The encounter diagnosis was Acute myofascial strain of lumbar region, subsequent encounter.    Medical Information   Related to Industrial Injury? Yes    Subjective Complaints:  DOI: 5/26/2020, 4th visit  JAMSHID: Reaching, pulling from her right to left, an object weight around 150-200#, felt a pop in the right low back, feeling immediate pain.    At one point patient was given prednisone and cyclobenzaprine which did not seem to change her symptoms.  She was seen 3 days ago and has not had any change in symptoms but needs some paperwork filled out for her employer.  She has heard from physical therapy and has her first appointment scheduled for 6/16.  Continues to deny saddle anesthesia, bowel/bladder incontinence/retention, extremity weakness.  Continues to have improvement with Voltaren gel.      Objective Findings: Back: Slight decreased range of motion in all planes due to pain.  Antalgic gait noted.  Extremities: No motor or sensory deficit noted in the bilateral lower extremities.  Strong prepatellar and Achilles DTRs noted bilaterally.  Strong plantar flexion/dorsiflexion.   Pre-Existing Condition(s):     Assessment:   Condition Same    Status: Additional Care Required  Permanent Disability:No    Plan: Medication    Diagnostics:      Comments:       Disability Information   Status: Released to Restricted Duty    From:  6/10/2020  Through: 7/8/2020 Restrictions are: Temporary   Physical Restrictions   Sitting:    Standing:   Stooping:    Bending:      Squatting:    Walking:    Climbing:    Pushing:      Pulling:    Other:    Reaching Above Shoulder (L):   Reaching Above Shoulder (R):       Reaching Below Shoulder (L):    Reaching Below Shoulder (R):      Not to exceed Weight Limits   Carrying(hrs):   Weight Limit(lb):   Lifting(hrs):   Weight  Limit(lb):     Comments: Maintain previous restrictions.    Repetitive Actions   Hands: i.e. Fine Manipulations from Grasping:     Feet: i.e. Operating Foot Controls:     Driving / Operate Machinery:     Physician Name: Nishi Dunn P.A.-C. Physician Signature:   e-Signature: Dr. Don Barnett, Medical Director   Clinic Name / Location: [address] [address] Clinic Phone Number: Dept: [phone]   Appointment Time: 2:55 Pm Visit Start Time: 2:57 PM   Check-In Time:  2:50 Pm Visit Discharge Time:  3:40 Pm   Original-Treating Physician or Chiropractor    Page 2-Insurer/TPA    Page 3-Employer    Page 4-Employee

## 2020-06-18 ENCOUNTER — OCCUPATIONAL MEDICINE (OUTPATIENT)
Dept: OCCUPATIONAL MEDICINE | Facility: CLINIC | Age: 20
End: 2020-06-18
Payer: COMMERCIAL

## 2020-06-18 VITALS
SYSTOLIC BLOOD PRESSURE: 124 MMHG | RESPIRATION RATE: 12 BRPM | BODY MASS INDEX: 20.73 KG/M2 | HEART RATE: 79 BPM | OXYGEN SATURATION: 96 % | HEIGHT: 69 IN | WEIGHT: 140 LBS | DIASTOLIC BLOOD PRESSURE: 62 MMHG | TEMPERATURE: 98 F

## 2020-06-18 DIAGNOSIS — S39.012D STRAIN OF LUMBAR REGION, SUBSEQUENT ENCOUNTER: ICD-10-CM

## 2020-06-18 PROCEDURE — 99203 OFFICE O/P NEW LOW 30 MIN: CPT | Performed by: PREVENTIVE MEDICINE

## 2020-06-18 RX ORDER — DICLOFENAC SODIUM 75 MG/1
75 TABLET, DELAYED RELEASE ORAL 2 TIMES DAILY
Qty: 60 TAB | Refills: 0 | Status: SHIPPED | OUTPATIENT
Start: 2020-06-18 | End: 2021-07-29

## 2020-06-18 ASSESSMENT — PAIN SCALES - GENERAL: PAINLEVEL: 7=MODERATE-SEVERE PAIN

## 2020-06-18 NOTE — PROGRESS NOTES
"Subjective:     Nancy Nesbitt is a 19 y.o. female who presents for Other (left lower back #19)      DOI 5/26/2020: 20 yo female presents with low back injury. JAMSHID: Reaching, pulling from her right to left, an object weight around 150-200#, felt a pop in the right low back, feeling immediate pain. She was seen in x4, given NSAIDs, referral to physical therapy and work restrictions.  She was tried on oral steroids and muscle relaxers without improvement.  She states pain seems about the same as it was at the beginning of her injury.  Pain is most on the right side of her lower back.  She does get occasional numbness and tingling down the right leg.  She states she is attended 1 session of physical therapy did not help much improvement.  She takes occasional ibuprofen for relief.  Denies prior back injuries.    ROS: All systems were reviewed on intake form, form was reviewed and signed. See scanned documents in media. Pertinent positives and negatives included in HPI.    PMH: No pertinent past medical history to this problem  MEDS: Medications were reviewed in Epic  ALLERGIES:   Allergies   Allergen Reactions   • Latex      Hives      SOCHX: Works as a  at [employer]  FH: No pertinent family history to this problem       Objective:     /62 (BP Location: Right arm, Patient Position: Sitting, BP Cuff Size: Adult long)   Pulse 79   Temp 36.7 °C (98 °F) (Temporal)   Resp 12   Ht 1.753 m (5' 9\")   Wt 63.5 kg (140 lb)   SpO2 96%   BMI 20.67 kg/m²     Constitutional: Patient is in no acute distress. Appears well-developed and well-nourished.   HENT: Normocephalic and atraumatic. EOM are normal. No scleral icterus.   Cardiovascular: Normal rate.    Pulmonary/Chest: Effort normal. No respiratory distress.   Neurological: Patient is alert and oriented to person, place, and time.   Skin: Skin is warm and dry.   Psychiatric: Normal mood and affect. Behavior is normal.     Lumbar: No gross " deformity.  Moderate tenderness over the right paraspinal musculature and SI joint with mild tenderness on the left paraspinal musculature lower lumbar.  Slightly diminished range of motion in flexion, otherwise range of motion intact.  Straight leg test negative.  Lower extremity reflexes intact.    Assessment/Plan:       1. Strain of lumbar region, subsequent encounter  - diclofenac EC (VOLTAREN) 75 MG Tablet Delayed Response; Take 1 Tab by mouth 2 times a day.  Dispense: 60 Tab; Refill: 0  - REFERRAL TO RADIOLOGY  - MR-LUMBAR SPINE-W/O; Future    • Referral for MRI lumbar spine  • Continue physical therapy  • Prescribed diclofenac 75 mg twice daily  • Okay to use heat/ice as needed.  Okay to use OTC muscle creams/ointments as needed  • Restricted duty  • Follow-up 3 weeks, sooner if MRI performed sooner    Differential diagnosis, natural history, supportive care, and indications for immediate follow-up discussed.

## 2020-06-18 NOTE — LETTER
[address],   [address] [address]  Phone:  [phone] - Fax:  [phone]   Occupational Health [facility] Progress Report and Disability Certification  Date of Service: 6/18/2020   No Show:  No  Date / Time of Next Visit: 7/9/2020 @ 10:45am   Claim Information   Patient Name: Nancy Nesbitt  Claim Number:     Employer: [employer] Date of Injury: 5/26/2020     Insurer / TPA: [employer]  ID / SSN:     Occupation: Production  Diagnosis: The encounter diagnosis was Strain of lumbar region, subsequent encounter.    Medical Information   Related to Industrial Injury? Yes    Subjective Complaints:  DOI 5/26/2020: 18 yo female presents with low back injury. JAMSHID: Reaching, pulling from her right to left, an object weight around 150-200#, felt a pop in the right low back, feeling immediate pain. She was seen in x4, given NSAIDs, referral to physical therapy and work restrictions.  She was tried on oral steroids and muscle relaxers without improvement.  She states pain seems about the same as it was at the beginning of her injury.  Pain is most on the right side of her lower back.  She does get occasional numbness and tingling down the right leg.  She states she is attended 1 session of physical therapy did not help much improvement.  She takes occasional ibuprofen for relief.  Denies prior back injuries.   Objective Findings: Lumbar: No gross deformity.  Moderate tenderness over the right paraspinal musculature and SI joint with mild tenderness on the left paraspinal musculature lower lumbar.  Slightly diminished range of motion in flexion, otherwise range of motion intact.  Straight leg test negative.  Lower extremity reflexes intact.   Pre-Existing Condition(s):     Assessment:   Condition Same    Status: Additional Care Required  Permanent Disability:No    Plan:      Diagnostics:      Comments:  Referral for MRI lumbar spine  Continue physical  therapy  Prescribed diclofenac 75 mg twice daily  Okay to use heat/ice as needed.  Okay to use OTC muscle creams/ointments as needed  Restricted duty  Follow-up 3 weeks, sooner if MRI performed sooner    Disability Information   Status: Released to Restricted Duty    From:  6/18/2020  Through: 7/9/2020 Restrictions are: Temporary   Physical Restrictions   Sitting:    Standing:  < or = to 2 hrs/day Stooping:  < or = to 1 hr/day Bending:  < or = to 1 hr/day   Squatting:    Walking:  < or = to 2 hrs/day Climbing:    Pushing:  < or = to 2 hrs/day   Pulling:  < or = to 2 hrs/day Other:    Reaching Above Shoulder (L):   Reaching Above Shoulder (R):       Reaching Below Shoulder (L):    Reaching Below Shoulder (R):      Not to exceed Weight Limits   Carrying(hrs):   Weight Limit(lb): < or = to 10 pounds Lifting(hrs):   Weight  Limit(lb): < or = to 10 pounds   Comments:      Repetitive Actions   Hands: i.e. Fine Manipulations from Grasping:     Feet: i.e. Operating Foot Controls:     Driving / Operate Machinery:     Physician Name: Sky Alejandre D.O. Physician Signature: SKY Ware D.O. e-Signature: Dr. Don Barnett, Medical Director   Clinic Name / Location: [address],   [address] [place] [address] Clinic Phone Number: Dept: [phone]   Appointment Time: 10:45 Am Visit Start Time: 11:23 AM   Check-In Time:  10:57 Am Visit Discharge Time:  11:33am   Original-Treating Physician or Chiropractor    Page 2-Insurer/TPA    Page 3-Employer    Page 4-Employee

## 2020-07-09 ENCOUNTER — OCCUPATIONAL MEDICINE (OUTPATIENT)
Dept: OCCUPATIONAL MEDICINE | Facility: CLINIC | Age: 20
End: 2020-07-09
Payer: COMMERCIAL

## 2020-07-09 VITALS
TEMPERATURE: 98.3 F | HEART RATE: 57 BPM | DIASTOLIC BLOOD PRESSURE: 62 MMHG | BODY MASS INDEX: 20.59 KG/M2 | WEIGHT: 139 LBS | SYSTOLIC BLOOD PRESSURE: 120 MMHG | OXYGEN SATURATION: 98 % | HEIGHT: 69 IN

## 2020-07-09 DIAGNOSIS — S39.012D STRAIN OF LUMBAR REGION, SUBSEQUENT ENCOUNTER: ICD-10-CM

## 2020-07-09 PROCEDURE — 99213 OFFICE O/P EST LOW 20 MIN: CPT | Performed by: PREVENTIVE MEDICINE

## 2020-07-09 ASSESSMENT — ENCOUNTER SYMPTOMS
SENSORY CHANGE: 0
TINGLING: 0

## 2020-07-09 NOTE — LETTER
[address],   [address] [address]  Phone:  [phone] - Fax:  [phone]   Occupational Health [facility] Progress Report and Disability Certification  Date of Service: 7/9/2020   No Show:  No  Date / Time of Next Visit: 8/13/20 @ 9:30 am   Claim Information   Patient Name: Nancy Nesbitt  Claim Number:     Employer: [employer]  Date of Injury: 5/26/2020     Insurer / TPA: [employer]  ID / SSN:     Occupation: Production  Diagnosis: The encounter diagnosis was Strain of lumbar region, subsequent encounter.    Medical Information   Related to Industrial Injury? Yes    Subjective Complaints:  DOI 5/26/2020: 20 yo female presents with low back injury. JAMSHID: Reaching, pulling from her right to left, an object weight around 150-200#, felt a pop in the right low back, feeling immediate pain. She was seen in x4, given NSAIDs, referral to physical therapy and work restrictions.  Patient notes some mild improvements with physical therapy, however does continue with significant pain in the lower back with some pain with range of motion.  She states her physical therapist recommending at least 6 weeks of physical therapy to continue.  She had her MRI performed last week.   Objective Findings: Lumbar: No gross deformity.  Diffuse tenderness lower paraspinal musculature.  Somewhat diminished flexion otherwise range of motion intact.     MRI lumbar: Mild L5-S1 facet arthropathy, otherwise no abnormal findings.   Pre-Existing Condition(s):     Assessment:   Condition Same    Status: Additional Care Required  Permanent Disability:No    Plan:      Diagnostics:      Comments:  Given minimal findings on MRI agree with more sessions of physical therapy  OTC ibuprofen/Tylenol as needed  Restricted  Follow-up 4 weeks    Disability Information   Status: Released to Restricted Duty    From:  7/9/2020  Through: 8/10/2020 Restrictions are: Temporary   Physical  Restrictions   Sitting:    Standing:  < or = to 4 hrs/day Stooping:  < or = to 1 hr/day Bending:  < or = to 1 hr/day   Squatting:    Walking:  < or = to 4 hrs/day Climbing:    Pushing:  < or = to 4 hrs/day   Pulling:  < or = to 4 hrs/day Other:    Reaching Above Shoulder (L):   Reaching Above Shoulder (R):       Reaching Below Shoulder (L):    Reaching Below Shoulder (R):      Not to exceed Weight Limits   Carrying(hrs):   Weight Limit(lb): < or = to 10 pounds Lifting(hrs):   Weight  Limit(lb): < or = to 10 pounds   Comments:      Repetitive Actions   Hands: i.e. Fine Manipulations from Grasping:     Feet: i.e. Operating Foot Controls:     Driving / Operate Machinery:     Provider Name:   Sky Alejandre D.O. Physician Signature:  Physician Name:     Clinic Name / Location: [address] [address] Clinic Phone Number: Dept: [phone]   Appointment Time: 10:45 Am Visit Start Time: 10:29 AM   Check-In Time:  10:21 Am Visit Discharge Time: 11:03 AM   Original-Treating Physician or Chiropractor    Page 2-Insurer/TPA    Page 3-Employer    Page 4-Employee

## 2020-07-09 NOTE — PROGRESS NOTES
"Subjective:     Nancy Nesbitt is a 19 y.o. female who presents for Other (WC DOI 5/26/20 back injury, feeling the same, room 19)      DOI 5/26/2020: 20 yo female presents with low back injury. JAMSHID: Reaching, pulling from her right to left, an object weight around 150-200#, felt a pop in the right low back, feeling immediate pain. She was seen in x4, given NSAIDs, referral to physical therapy and work restrictions.  Patient notes some mild improvements with physical therapy, however does continue with significant pain in the lower back with some pain with range of motion.  She states her physical therapist recommending at least 6 weeks of physical therapy to continue.  She had her MRI performed last week.    Review of Systems   Neurological: Negative for tingling and sensory change.       SOCHX: Works as a  at [email or web address]  FH: No pertinent family history to this problem.       Objective:     /62   Pulse (!) 57   Temp 36.8 °C (98.3 °F)   Ht 1.753 m (5' 9\")   Wt 63 kg (139 lb)   SpO2 98%   BMI 20.53 kg/m²     Constitutional: Patient is in no acute distress. Appears well-developed and well-nourished.   Cardiovascular: Normal rate.    Pulmonary/Chest: Effort normal. No respiratory distress.   Neurological: Patient is alert and oriented to person, place, and time.   Skin: Skin is warm and dry.   Psychiatric: Normal mood and affect. Behavior is normal.     Lumbar: No gross deformity.  Diffuse tenderness lower paraspinal musculature.  Somewhat diminished flexion otherwise range of motion intact.     MRI lumbar: Mild L5-S1 facet arthropathy, otherwise no abnormal findings.    Assessment/Plan:       1. Strain of lumbar region, subsequent encounter  - REFERRAL TO PHYSICAL THERAPY Reason for Therapy: Eval/Treat/Report    • Given minimal findings on MRI agree with more sessions of physical therapy  • OTC ibuprofen/Tylenol as needed  • Restricted  • Follow-up 4 weeks    Differential diagnosis, " natural history, supportive care, and indications for immediate follow-up discussed.

## 2020-08-10 ENCOUNTER — OCCUPATIONAL MEDICINE (OUTPATIENT)
Dept: OCCUPATIONAL MEDICINE | Facility: CLINIC | Age: 20
End: 2020-08-10
Payer: COMMERCIAL

## 2020-08-10 VITALS
TEMPERATURE: 98.9 F | OXYGEN SATURATION: 96 % | RESPIRATION RATE: 12 BRPM | HEART RATE: 60 BPM | WEIGHT: 139 LBS | DIASTOLIC BLOOD PRESSURE: 60 MMHG | SYSTOLIC BLOOD PRESSURE: 122 MMHG | HEIGHT: 69 IN | BODY MASS INDEX: 20.59 KG/M2

## 2020-08-10 DIAGNOSIS — S39.012D STRAIN OF LUMBAR REGION, SUBSEQUENT ENCOUNTER: ICD-10-CM

## 2020-08-10 PROCEDURE — 99213 OFFICE O/P EST LOW 20 MIN: CPT | Performed by: PREVENTIVE MEDICINE

## 2020-08-10 ASSESSMENT — PAIN SCALES - GENERAL: PAINLEVEL: 9=SEVERE PAIN

## 2020-08-10 ASSESSMENT — ENCOUNTER SYMPTOMS
TINGLING: 0
SENSORY CHANGE: 0

## 2020-08-10 NOTE — LETTER
[address],   [address] [address]  Phone:  [phone] - Fax:  [phone]   Occupational Health [facility] Progress Report and Disability Certification  Date of Service: 8/10/2020   No Show:  No  Date / Time of Next Visit: 9/14/2020 @ 9:15 AM   Claim Information   Patient Name: Nancy Nesbitt  Claim Number:     Employer: [employer]  Date of Injury: 5/26/2020     Insurer / TPA: [employer]  ID / SSN:     Occupation: Production  Diagnosis: The encounter diagnosis was Strain of lumbar region, subsequent encounter.    Medical Information   Related to Industrial Injury? Yes    Subjective Complaints:  DOI 5/26/2020: 20 yo female presents with low back injury. JAMSHID: Reaching, pulling from her right to left, an object weight around 150-200#, felt a pop in the right low back, feeling immediate pain.  Patient states that she has been doing physical therapy in about 2 weeks ago had sudden worsening while doing some exercises at PT.  She has had severe pain since then.  Taking ibuprofen for relief.  Has another 3 weeks of physical therapy approved.   Objective Findings: Lumbar: No gross deformity.  Diffuse tenderness lower paraspinal musculature.  Diminished range of motion to less than 45 degrees flexion, rotation slightly diminished bilaterally t.      MRI lumbar: Mild L5-S1 facet arthropathy, otherwise no abnormal findings.   Pre-Existing Condition(s):     Assessment:   Condition Worsened    Status: Additional Care Required  Permanent Disability:No    Plan:      Diagnostics:      Comments:  Given continued symptoms referral to physiatry  Continue OTC ibuprofen  Restricted duty  Follow-up 4 weeks if not seen by physiatry    Disability Information   Status: Released to Restricted Duty    From:  8/10/2020  Through: 9/14/2020 Restrictions are: Temporary   Physical Restrictions   Sitting:    Standing:  < or = to 6 hrs/day Stooping:  < or = to 1 hr/day  Bending:  < or = to 1 hr/day   Squatting:    Walking:  < or = to 4 hrs/day Climbing:    Pushing:  < or = to 4 hrs/day   Pulling:  < or = to 4 hrs/day Other:    Reaching Above Shoulder (L):   Reaching Above Shoulder (R):       Reaching Below Shoulder (L):    Reaching Below Shoulder (R):      Not to exceed Weight Limits   Carrying(hrs):   Weight Limit(lb): < or = to 10 pounds Lifting(hrs):   Weight  Limit(lb): < or = to 10 pounds   Comments:      Repetitive Actions   Hands: i.e. Fine Manipulations from Grasping:     Feet: i.e. Operating Foot Controls:     Driving / Operate Machinery:     Provider Name:   Sky Alejandre D.O. Physician Signature:  Physician Name:     Clinic Name / Location: [address],   [address] [address] Clinic Phone Number: Dept: [phone]   Appointment Time: 9:15 Am Visit Start Time: 8:37 AM   Check-In Time:  8:33 Am Visit Discharge Time: 9:14 AM    Original-Treating Physician or Chiropractor    Page 2-Insurer/TPA    Page 3-Employer    Page 4-Employee

## 2020-08-10 NOTE — PROGRESS NOTES
"Subjective:     Nancy Nesbitt is a 19 y.o. female who presents for No chief complaint on file.      DOI 5/26/2020: 20 yo female presents with low back injury. JAMSHID: Reaching, pulling from her right to left, an object weight around 150-200#, felt a pop in the right low back, feeling immediate pain.  Patient states that she has been doing physical therapy in about 2 weeks ago had sudden worsening while doing some exercises at PT.  She has had severe pain since then.  Taking ibuprofen for relief.  Has another 3 weeks of physical therapy approved.    Review of Systems   Neurological: Negative for tingling and sensory change.       SOCHX: Works as a  at [employer]  FH: No pertinent family history to this problem.       Objective:     /60   Pulse 60   Temp 37.2 °C (98.9 °F)   Resp 12   Ht 1.753 m (5' 9\")   Wt 63 kg (139 lb)   SpO2 96%   BMI 20.53 kg/m²     Constitutional: Patient is in no acute distress. Appears well-developed and well-nourished.   Cardiovascular: Normal rate.    Pulmonary/Chest: Effort normal. No respiratory distress.   Neurological: Patient is alert and oriented to person, place, and time.   Skin: Skin is warm and dry.   Psychiatric: Normal mood and affect. Behavior is normal.     Lumbar: No gross deformity.  Diffuse tenderness lower paraspinal musculature.  Diminished range of motion to less than 45 degrees flexion, rotation slightly diminished bilaterally t.      MRI lumbar: Mild L5-S1 facet arthropathy, otherwise no abnormal findings.    Assessment/Plan:       1. Strain of lumbar region, subsequent encounter  - REFERRAL TO PHYSIATRY (PMR)    • Given continued symptoms referral to physiatry  • Continue OTC ibuprofen  • Restricted duty  • Follow-up 4 weeks if not seen by physiatry    Differential diagnosis, natural history, supportive care, and indications for immediate follow-up discussed.  "

## 2020-12-23 NOTE — PROGRESS NOTES
"Chief Complaint   Patient presents with   • Follow-Up     workmans comp follow up. pt states she feels the same. nothing has changed, pt states that she is still in pain.        HISTORY OF PRESENT ILLNESS: Patient is a 19 y.o. female who presents today for the following:    DOI: 5/26/2020, 4th visit  JAMSHID: Reaching, pulling from her right to left, an object weight around 150-200#, felt a pop in the right low back, feeling immediate pain.    At one point patient was given prednisone and cyclobenzaprine which did not seem to change her symptoms.  She was seen 3 days ago and has not had any change in symptoms but needs some paperwork filled out for her employer.  She has heard from physical therapy and has her first appointment scheduled for 6/16.  Continues to deny saddle anesthesia, bowel/bladder incontinence/retention, extremity weakness.  Continues to have improvement with Voltaren gel.     Allergies:Latex     PMH: No pertinent past medical history to this problem  MEDS: Medications were reviewed in Epic  ALLERGIES: Allergies were reviewed in Epic  SOCHX: Works at [employer]  FH: No pertinent family history to this problem    Review of Systems:    Constitutional ROS: No unexpected change in weight, No weakness, No fatigue  Musculoskeletal/Extremities ROS: right sided low back pain.  Skin/Integumentary ROS: No edema, No evidence of rash, No itching    Exam:  /82   Pulse 71   Temp 37.1 °C (98.8 °F) (Temporal)   Resp 16   Ht 1.753 m (5' 9\")   Wt 66.5 kg (146 lb 9.6 oz)   SpO2 99%   General: Well developed, well nourished. No distress.  Pulmonary: Unlabored respiratory effort.   Back: Slight decreased range of motion in all planes due to pain.  Antalgic gait noted.  Extremities: No motor or sensory deficit noted in the bilateral lower extremities.  Strong prepatellar and Achilles DTRs noted bilaterally.  Strong plantar flexion/dorsiflexion.  Neurologic: Grossly nonfocal. No facial asymmetry noted.  Skin: Warm, " Chornic. New to me today. Ongoing for about 10 years. Currently taking ambien 10 mg and clonazepam 1mg nightly for this. If she is still unable to sleep when she is having worsening stress and anxiety, she will take an additional alprazolam 1 mg with this.     Currently seeing psychiatry for this, Christal Gage, who prescribes her medications.    dry, good turgor. No rashes in visible areas.   Psych: Normal mood. Alert and oriented x3. Judgment and insight is normal.    Assessment/Plan:  Maintain current restrictions.  Continue Voltaren gel as needed for pain.  Return to clinic in 1 month for reevaluation, sooner for any significant changes in symptoms.  1. Acute myofascial strain of lumbar region, subsequent encounter

## 2021-07-29 ENCOUNTER — TELEPHONE (OUTPATIENT)
Dept: URGENT CARE | Facility: PHYSICIAN GROUP | Age: 21
End: 2021-07-29

## 2021-07-29 ENCOUNTER — OFFICE VISIT (OUTPATIENT)
Dept: URGENT CARE | Facility: PHYSICIAN GROUP | Age: 21
End: 2021-07-29

## 2021-07-29 ENCOUNTER — APPOINTMENT (OUTPATIENT)
Dept: RADIOLOGY | Facility: IMAGING CENTER | Age: 21
End: 2021-07-29
Attending: PHYSICIAN ASSISTANT

## 2021-07-29 VITALS
WEIGHT: 150 LBS | BODY MASS INDEX: 22.22 KG/M2 | SYSTOLIC BLOOD PRESSURE: 102 MMHG | OXYGEN SATURATION: 98 % | RESPIRATION RATE: 16 BRPM | DIASTOLIC BLOOD PRESSURE: 72 MMHG | HEART RATE: 62 BPM | TEMPERATURE: 98.1 F | HEIGHT: 69 IN

## 2021-07-29 DIAGNOSIS — M65.4 DE QUERVAIN'S TENOSYNOVITIS, RIGHT: ICD-10-CM

## 2021-07-29 DIAGNOSIS — M25.531 RIGHT WRIST PAIN: ICD-10-CM

## 2021-07-29 DIAGNOSIS — M67.431 GANGLION CYST OF DORSUM OF RIGHT WRIST: ICD-10-CM

## 2021-07-29 PROCEDURE — 99213 OFFICE O/P EST LOW 20 MIN: CPT | Performed by: PHYSICIAN ASSISTANT

## 2021-07-29 PROCEDURE — 73110 X-RAY EXAM OF WRIST: CPT | Mod: TC,FY,RT | Performed by: PHYSICIAN ASSISTANT

## 2021-07-29 RX ORDER — IBUPROFEN 200 MG
200 TABLET ORAL EVERY 6 HOURS PRN
COMMUNITY
End: 2022-04-19

## 2021-07-29 ASSESSMENT — ENCOUNTER SYMPTOMS
FEVER: 0
PALPITATIONS: 0
SENSORY CHANGE: 0
SHORTNESS OF BREATH: 0
CHILLS: 0
SORE THROAT: 0
VOMITING: 0
TINGLING: 0
BLURRED VISION: 0
NAUSEA: 0

## 2021-07-29 NOTE — PROGRESS NOTES
"Subjective:      Nancy Nesbitt is a 20 y.o. female who presents with Hand Pain (R hand states the pain is radiating up wrist, x4days )    HPI:  Nancy Nesbitt is a 20 y.o. female who presents today for evaluation of right hand/wrist pain. Patient states that she woke up ~4 days ago with a \"bump\" on dorsal aspect of her right hand near the wrist.  She states that it was little bit discolored like a bruise as well.  She says that it has not changed in character but is sometimes more painful than others.  She also notes pain on the radial aspect of her right wrist extending into her forearm.  Pain is worse with certain movements.  She denies any specific injury but does note that she uses her hands a lot while she is at work.  She says that occasionally she gets some pain that shoots into her right hand as well as some intermittent numbness and tingling.      Review of Systems   Constitutional: Negative for chills and fever.   HENT: Negative for sore throat.    Eyes: Negative for blurred vision.   Respiratory: Negative for shortness of breath.    Cardiovascular: Negative for chest pain and palpitations.   Gastrointestinal: Negative for nausea and vomiting.   Musculoskeletal: Positive for joint pain (right wrist).   Neurological: Negative for tingling and sensory change.       PMH:  has no past medical history on file.  MEDS:   Current Outpatient Medications:   •  ibuprofen (MOTRIN) 200 MG Tab, Take 200 mg by mouth every 6 hours as needed., Disp: , Rfl:   ALLERGIES:   Allergies   Allergen Reactions   • Latex      Hives      SURGHX: No past surgical history on file.  SOCHX:  reports that she has been smoking cigarettes. Her smokeless tobacco use includes chew. She reports current alcohol use. She reports that she does not use drugs.  FH: Family history was reviewed, no pertinent findings to report     Objective:     /72   Pulse 62   Temp 36.7 °C (98.1 °F) (Temporal)   Resp 16   Ht 1.753 m (5' 9\")   " "Wt 68 kg (150 lb)   SpO2 98%   BMI 22.15 kg/m²      Physical Exam  Constitutional:       Appearance: She is well-developed.   HENT:      Head: Normocephalic and atraumatic.      Right Ear: External ear normal.      Left Ear: External ear normal.   Eyes:      Conjunctiva/sclera: Conjunctivae normal.      Pupils: Pupils are equal, round, and reactive to light.   Cardiovascular:      Rate and Rhythm: Normal rate and regular rhythm.      Heart sounds: Normal heart sounds. No murmur heard.     Pulmonary:      Effort: Pulmonary effort is normal.      Breath sounds: Normal breath sounds. No wheezing.   Musculoskeletal:      Right wrist: Swelling, tenderness, bony tenderness and snuff box tenderness present. Normal range of motion.      Comments: Right wrist/hand: Dorsal aspect of right hand just distal to the wrist on the radial aspect exhibits a small palpable \"bump\" with surrounding yellowish/greenish ecchymosis.  It is tender to palpation.  Radial aspect of right wrist extending to the palm exhibits very mild soft tissue swelling with tenderness palpation.  Positive Finkelstein's.  Patient has full range of motion but notes pain with range of motion, especially with radial deviation and supination.  Distal neurovascular intact.  5/5 strength of upper extremities bilaterally.  Cap refill less than 2 seconds.   Skin:     General: Skin is warm and dry.      Capillary Refill: Capillary refill takes less than 2 seconds.   Neurological:      Mental Status: She is alert and oriented to person, place, and time.   Psychiatric:         Behavior: Behavior normal.         Judgment: Judgment normal.           DX-WRIST-COMPLETE 3+ RIGHT  IMPRESSION:  No radiographic evidence of acute traumatic bone injury.     Possible small metallic foreign body on or in the soft tissues at the level of the proximal lateral metacarpals.       *X-rays were reviewed and interpreted independently by me. I agree with the radiologist's findings        " "  Assessment/Plan:     1. Right wrist pain  - DX-WRIST-COMPLETE 3+ RIGHT; Future    2. De Quervain's tenosynovitis, right    3. Ganglion cyst of dorsum of right wrist      X-ray does not show any acute findings for the area in question.  Discussed with patient that I believe she may have more than one thing happening in her wrist.  She has positive Finkelstein's and pain consistent with de Quervain's tenosynovitis.  The palpable \"bump\" also seems like it is could be consistent with a ganglion cyst although the surrounding ecchymosis is a bit unusual.  Patient was given a thumb spica splint.  Recommend that she use the splint fairly consistently for the next 4 to 5 days and then start using only as needed.  Also recommend the use of an OTC anti-inflammatory such as Aleve and application of ice to the affected area multiple times per day.  Discussed option of putting in a referral to orthopedics but as patient does not have insurance she states that if her symptoms do not improve she will go to [facility].          Differential Diagnosis, natural history, and supportive care discussed. Return to the Urgent Care or follow up with your PCP if symptoms fail to resolve, or for any new or worsening symptoms. Emergency room precautions discussed. Patient and/or family appears understanding of information.      "

## 2022-03-05 ENCOUNTER — APPOINTMENT (OUTPATIENT)
Dept: URGENT CARE | Facility: PHYSICIAN GROUP | Age: 22
End: 2022-03-05
Payer: COMMERCIAL

## 2022-03-07 ENCOUNTER — TELEPHONE (OUTPATIENT)
Dept: SCHEDULING | Facility: IMAGING CENTER | Age: 22
End: 2022-03-07
Payer: COMMERCIAL

## 2022-03-10 ENCOUNTER — OFFICE VISIT (OUTPATIENT)
Dept: MEDICAL GROUP | Facility: CLINIC | Age: 22
End: 2022-03-10
Payer: COMMERCIAL

## 2022-03-10 VITALS
SYSTOLIC BLOOD PRESSURE: 110 MMHG | HEIGHT: 69 IN | WEIGHT: 146.4 LBS | HEART RATE: 70 BPM | BODY MASS INDEX: 21.68 KG/M2 | DIASTOLIC BLOOD PRESSURE: 72 MMHG | OXYGEN SATURATION: 99 % | TEMPERATURE: 98.9 F | RESPIRATION RATE: 16 BRPM

## 2022-03-10 DIAGNOSIS — R21 RASH: ICD-10-CM

## 2022-03-10 DIAGNOSIS — M25.50 ARTHRALGIA, UNSPECIFIED JOINT: ICD-10-CM

## 2022-03-10 PROCEDURE — 99213 OFFICE O/P EST LOW 20 MIN: CPT | Performed by: PHYSICIAN ASSISTANT

## 2022-03-10 ASSESSMENT — PATIENT HEALTH QUESTIONNAIRE - PHQ9: CLINICAL INTERPRETATION OF PHQ2 SCORE: 0

## 2022-03-10 NOTE — PROGRESS NOTES
"cc:  rash    Subjective:     Nancy Nesbitt is a 21 y.o. female presenting for rash      Patient presents to the office for rash.  Patient has had increased stress.  She has had a rash on her hands.  She states that she was having joint and muscle pain for a week.  She states that she was having chest pain.  She was having a problem sleeping. She states she had a fever but was not ill.  She states that it occurred on her hands and this is the third time in a year.  There is a family history of lupus.  She does show pictures which indicates a macular rash on the palms of her hands and pitting in her toenails.   She was given a medrol dose pack from the ER.  She does get a rash on her face every year.     Review of systems:  See above.   Denies any symptoms unless previously indicated.        Current Outpatient Medications:   •  ibuprofen (MOTRIN) 200 MG Tab, Take 200 mg by mouth every 6 hours as needed. (Patient not taking: Reported on 3/10/2022), Disp: , Rfl:     Allergies, past medical history, past surgical history, family history, social history reviewed and updated    Objective:     Vitals: /72 (BP Location: Right arm)   Pulse 70   Temp 37.2 °C (98.9 °F) (Temporal)   Resp 16   Ht 1.753 m (5' 9\")   Wt 66.4 kg (146 lb 6.4 oz)   LMP 03/01/2022 (Approximate)   SpO2 99%   Breastfeeding No   BMI 21.62 kg/m²   General: Alert, pleasant, NAD  EYES:   PERRL, EOMI, no icterus or pallor.  Conjunctivae and lids normal.   HENT:  Normocephalic.  External ears normal.   Neck supple.     Respiratory: Normal respiratory effort.  Clear to auscultation bilaterally.  Abdomen: Not obese.   Skin: Warm, dry, no rashes.  Musculoskeletal: Gait is normal.  Moves all extremities well.    Extremities: no rash at this time.  Maculopapular on palms of hands in pictures.  Pitting nails in feet in pictures with plaques on toes bilaterally.   Neurological: No tremors, sensation grossly intact, CN2-12 intact.  Psych:  " Affect/mood is normal, judgement is good, memory is intact, grooming is appropriate.    Assessment/Plan:     Nancy was seen today for establish care and rash.    Diagnoses and all orders for this visit:    Rash  -     CBC WITH DIFFERENTIAL; Future  -     TSH; Future  -     FREE THYROXINE; Future  -     Sed Rate; Future  -     RHEUMATOID ARTHRITIS FACTOR; Future  -     NASRA IGG ABNER W/RFLX TO NASRA IGG IFA; Future  -     CRP HIGH SENSITIVE (CARDIAC); Future  -     ANTI-DNA (DS); Future  -     C3+C4+COMPT  -     ANTITHYROGLOBULIN AB; Future  -     COMPLEMENT TOTAL (CH50); Future  -     EVELIO CONFIRM PANEL; Future  -     MPO/IL-3 (ANCA) ABS; Future  -     SMITH AB IGG; Future  -     THYROID PEROXIDASE  (TPO) AB; Future  -     URINALYSIS,CULTURE IF INDICATED; Future  -     Comp Metabolic Panel; Future    Arthralgia, unspecified joint  -     CBC WITH DIFFERENTIAL; Future  -     TSH; Future  -     FREE THYROXINE; Future  -     Sed Rate; Future  -     RHEUMATOID ARTHRITIS FACTOR; Future  -     NASRA IGG ABNER W/RFLX TO NASRA IGG IFA; Future  -     CRP HIGH SENSITIVE (CARDIAC); Future  -     ANTI-DNA (DS); Future  -     C3+C4+COMPT  -     ANTITHYROGLOBULIN AB; Future  -     COMPLEMENT TOTAL (CH50); Future  -     EVELIO CONFIRM PANEL; Future  -     MPO/IL-3 (ANCA) ABS; Future  -     SMITH AB IGG; Future  -     THYROID PEROXIDASE  (TPO) AB; Future  -     URINALYSIS,CULTURE IF INDICATED; Future  -     Comp Metabolic Panel; Future      Labs ordered as indicated.  Will follow up in 2-6 weeks.  Considerations give for lupus RA and psoriasis.  If labs are negative will consider repeating in 3-6 months.       Return for 2-6 weeks.  labs.    Please note that this dictation was created using voice recognition software. I have made every reasonable attempt to correct obvious errors, but expect that there are errors of grammar and possible content that I did not discover before finalizing note.

## 2022-03-11 ENCOUNTER — HOSPITAL ENCOUNTER (OUTPATIENT)
Dept: LAB | Facility: MEDICAL CENTER | Age: 22
End: 2022-03-11
Attending: PHYSICIAN ASSISTANT
Payer: COMMERCIAL

## 2022-03-11 DIAGNOSIS — R21 RASH: ICD-10-CM

## 2022-03-11 DIAGNOSIS — M25.50 ARTHRALGIA, UNSPECIFIED JOINT: ICD-10-CM

## 2022-03-11 LAB
APPEARANCE UR: CLEAR
BASOPHILS # BLD AUTO: 0.8 % (ref 0–1.8)
BASOPHILS # BLD: 0.06 K/UL (ref 0–0.12)
BILIRUB UR QL STRIP.AUTO: NEGATIVE
C3 SERPL-MCNC: 104.5 MG/DL (ref 87–200)
C4 SERPL-MCNC: 19.7 MG/DL (ref 19–52)
COLOR UR: YELLOW
EOSINOPHIL # BLD AUTO: 0.11 K/UL (ref 0–0.51)
EOSINOPHIL NFR BLD: 1.4 % (ref 0–6.9)
ERYTHROCYTE [DISTWIDTH] IN BLOOD BY AUTOMATED COUNT: 42.1 FL (ref 35.9–50)
ERYTHROCYTE [SEDIMENTATION RATE] IN BLOOD BY WESTERGREN METHOD: 3 MM/HOUR (ref 0–25)
GLUCOSE UR STRIP.AUTO-MCNC: NEGATIVE MG/DL
HCT VFR BLD AUTO: 44.6 % (ref 37–47)
HGB BLD-MCNC: 14.4 G/DL (ref 12–16)
IMM GRANULOCYTES # BLD AUTO: 0.2 K/UL (ref 0–0.11)
IMM GRANULOCYTES NFR BLD AUTO: 2.5 % (ref 0–0.9)
KETONES UR STRIP.AUTO-MCNC: NEGATIVE MG/DL
LEUKOCYTE ESTERASE UR QL STRIP.AUTO: NEGATIVE
LYMPHOCYTES # BLD AUTO: 2.91 K/UL (ref 1–4.8)
LYMPHOCYTES NFR BLD: 36.6 % (ref 22–41)
MCH RBC QN AUTO: 27.7 PG (ref 27–33)
MCHC RBC AUTO-ENTMCNC: 32.3 G/DL (ref 33.6–35)
MCV RBC AUTO: 85.9 FL (ref 81.4–97.8)
MICRO URNS: NORMAL
MONOCYTES # BLD AUTO: 0.64 K/UL (ref 0–0.85)
MONOCYTES NFR BLD AUTO: 8.1 % (ref 0–13.4)
NEUTROPHILS # BLD AUTO: 4.03 K/UL (ref 2–7.15)
NEUTROPHILS NFR BLD: 50.6 % (ref 44–72)
NITRITE UR QL STRIP.AUTO: NEGATIVE
NRBC # BLD AUTO: 0 K/UL
NRBC BLD-RTO: 0 /100 WBC
PH UR STRIP.AUTO: 7.5 [PH] (ref 5–8)
PLATELET # BLD AUTO: 423 K/UL (ref 164–446)
PMV BLD AUTO: 9.1 FL (ref 9–12.9)
PROT UR QL STRIP: NEGATIVE MG/DL
RBC # BLD AUTO: 5.19 M/UL (ref 4.2–5.4)
RBC UR QL AUTO: NEGATIVE
RHEUMATOID FACT SER IA-ACNC: <10 IU/ML (ref 0–14)
SP GR UR STRIP.AUTO: 1.02
T4 FREE SERPL-MCNC: 1.46 NG/DL (ref 0.93–1.7)
THYROPEROXIDASE AB SERPL-ACNC: 16 IU/ML (ref 0–9)
TSH SERPL DL<=0.005 MIU/L-ACNC: 1.12 UIU/ML (ref 0.38–5.33)
UROBILINOGEN UR STRIP.AUTO-MCNC: 0.2 MG/DL
WBC # BLD AUTO: 8 K/UL (ref 4.8–10.8)

## 2022-03-11 PROCEDURE — 86225 DNA ANTIBODY NATIVE: CPT

## 2022-03-11 PROCEDURE — 80053 COMPREHEN METABOLIC PANEL: CPT

## 2022-03-11 PROCEDURE — 86039 ANTINUCLEAR ANTIBODIES (ANA): CPT

## 2022-03-11 PROCEDURE — 86431 RHEUMATOID FACTOR QUANT: CPT

## 2022-03-11 PROCEDURE — 85652 RBC SED RATE AUTOMATED: CPT

## 2022-03-11 PROCEDURE — 86256 FLUORESCENT ANTIBODY TITER: CPT

## 2022-03-11 PROCEDURE — 86376 MICROSOMAL ANTIBODY EACH: CPT

## 2022-03-11 PROCEDURE — 84443 ASSAY THYROID STIM HORMONE: CPT

## 2022-03-11 PROCEDURE — 36415 COLL VENOUS BLD VENIPUNCTURE: CPT

## 2022-03-11 PROCEDURE — 86141 C-REACTIVE PROTEIN HS: CPT

## 2022-03-11 PROCEDURE — 84439 ASSAY OF FREE THYROXINE: CPT

## 2022-03-11 PROCEDURE — 86162 COMPLEMENT TOTAL (CH50): CPT

## 2022-03-11 PROCEDURE — 83516 IMMUNOASSAY NONANTIBODY: CPT | Mod: 91

## 2022-03-11 PROCEDURE — 86235 NUCLEAR ANTIGEN ANTIBODY: CPT | Mod: 91

## 2022-03-11 PROCEDURE — 85025 COMPLETE CBC W/AUTO DIFF WBC: CPT

## 2022-03-11 PROCEDURE — 86800 THYROGLOBULIN ANTIBODY: CPT

## 2022-03-11 PROCEDURE — 86160 COMPLEMENT ANTIGEN: CPT | Mod: 91

## 2022-03-11 PROCEDURE — 81003 URINALYSIS AUTO W/O SCOPE: CPT

## 2022-03-11 PROCEDURE — 86038 ANTINUCLEAR ANTIBODIES: CPT

## 2022-03-12 LAB
ALBUMIN SERPL BCP-MCNC: 4.6 G/DL (ref 3.2–4.9)
ALBUMIN/GLOB SERPL: 1.9 G/DL
ALP SERPL-CCNC: 61 U/L (ref 30–99)
ALT SERPL-CCNC: 11 U/L (ref 2–50)
ANION GAP SERPL CALC-SCNC: 10 MMOL/L (ref 7–16)
AST SERPL-CCNC: 15 U/L (ref 12–45)
BILIRUB SERPL-MCNC: 0.3 MG/DL (ref 0.1–1.5)
BUN SERPL-MCNC: 13 MG/DL (ref 8–22)
CALCIUM SERPL-MCNC: 9.9 MG/DL (ref 8.5–10.5)
CHLORIDE SERPL-SCNC: 104 MMOL/L (ref 96–112)
CO2 SERPL-SCNC: 26 MMOL/L (ref 20–33)
CREAT SERPL-MCNC: 0.67 MG/DL (ref 0.5–1.4)
CRP SERPL HS-MCNC: 1.7 MG/L (ref 0–3)
GLOBULIN SER CALC-MCNC: 2.4 G/DL (ref 1.9–3.5)
GLUCOSE SERPL-MCNC: 80 MG/DL (ref 65–99)
POTASSIUM SERPL-SCNC: 5.7 MMOL/L (ref 3.6–5.5)
PROT SERPL-MCNC: 7 G/DL (ref 6–8.2)
SODIUM SERPL-SCNC: 140 MMOL/L (ref 135–145)

## 2022-03-15 LAB
DSDNA AB TITR SER CLIF: 83 IU (ref 0–24)
MYELOPEROXIDASE AB SER-ACNC: 13 AU/ML (ref 0–19)
NUCLEAR IGG SER QL IA: DETECTED
PROTEINASE3 AB SER-ACNC: 3 AU/ML (ref 0–19)

## 2022-03-16 LAB
ANA INTERPRETIVE COMMENT Q5143: NORMAL
ANTINUCLEAR ANTIBODY (ANA), HEP-2, IGG Q5142: NORMAL
CH50 SERPL-ACNC: 58.1 U/ML (ref 38.7–89.9)
ENA SM IGG SER-ACNC: 0 AU/ML (ref 0–40)
ENA SS-B IGG SER IA-ACNC: 0 AU/ML (ref 0–40)
SSA52 R0ENA AB IGG Q0420: 0 AU/ML (ref 0–40)
SSA60 R0ENA AB IGG Q0419: 0 AU/ML (ref 0–40)
U1 SNRNP IGG SER QL: 3 UNITS (ref 0–19)

## 2022-03-17 LAB
DSDNA IGG TITR SER CLIF: NORMAL {TITER}
THYROGLOB AB SERPL-ACNC: <0.9 IU/ML (ref 0–4)

## 2022-03-23 ENCOUNTER — OFFICE VISIT (OUTPATIENT)
Dept: MEDICAL GROUP | Facility: CLINIC | Age: 22
End: 2022-03-23
Payer: COMMERCIAL

## 2022-03-23 ENCOUNTER — PATIENT MESSAGE (OUTPATIENT)
Dept: MEDICAL GROUP | Facility: CLINIC | Age: 22
End: 2022-03-23

## 2022-03-23 VITALS
OXYGEN SATURATION: 100 % | HEART RATE: 61 BPM | SYSTOLIC BLOOD PRESSURE: 118 MMHG | DIASTOLIC BLOOD PRESSURE: 58 MMHG | BODY MASS INDEX: 22.22 KG/M2 | TEMPERATURE: 98.2 F | RESPIRATION RATE: 16 BRPM | WEIGHT: 150 LBS | HEIGHT: 69 IN

## 2022-03-23 DIAGNOSIS — R21 RASH: ICD-10-CM

## 2022-03-23 DIAGNOSIS — E87.5 HYPERKALEMIA: ICD-10-CM

## 2022-03-23 DIAGNOSIS — R76.8 THYROID ANTIBODY POSITIVE: ICD-10-CM

## 2022-03-23 DIAGNOSIS — R76.8 POSITIVE DOUBLE STRANDED DNA ANTIBODY TEST: ICD-10-CM

## 2022-03-23 PROCEDURE — 99213 OFFICE O/P EST LOW 20 MIN: CPT | Performed by: PHYSICIAN ASSISTANT

## 2022-03-23 RX ORDER — TRIAMCINOLONE ACETONIDE 1 MG/G
CREAM TOPICAL
Qty: 80 G | Refills: 0 | Status: SHIPPED | OUTPATIENT
Start: 2022-03-23 | End: 2022-04-19

## 2022-03-23 ASSESSMENT — FIBROSIS 4 INDEX: FIB4 SCORE: 0.25

## 2022-03-24 NOTE — PROGRESS NOTES
"cc:  Joint pain    Subjective:     Nancy Nesbitt is a 21 y.o. female presenting for joint pain      Patient presents to the office for joint pain.  She states that her pain is back to normal.  She states that the rash is also gone but it is peeling and then the skin gets caught and it pulls.  She is here to discuss her results further and determine next steps.  Other than the rash on her hands, she denies any other significant issues.    Review of systems:  See above.   Denies any symptoms unless previously indicated.        Current Outpatient Medications:   •  triamcinolone acetonide (KENALOG) 0.1 % Cream, Apply a small amount to affected area twice a day no more than 10 days, Disp: 80 g, Rfl: 0  •  ibuprofen (MOTRIN) 200 MG Tab, Take 200 mg by mouth every 6 hours as needed. (Patient not taking: No sig reported), Disp: , Rfl:     Allergies, past medical history, past surgical history, family history, social history reviewed and updated    Objective:     Vitals: /58 (BP Location: Left arm, Patient Position: Sitting, BP Cuff Size: Adult)   Pulse 61   Temp 36.8 °C (98.2 °F) (Temporal)   Resp 16   Ht 1.753 m (5' 9\") Comment: stated by pt  Wt 68 kg (150 lb)   LMP 03/01/2022 (Approximate)   SpO2 100%   BMI 22.15 kg/m²   General: Alert, pleasant, NAD  EYES:   PERRL, EOMI, no icterus or pallor.  Conjunctivae and lids normal.   HENT:  Normocephalic.  External ears normal.  Neck supple.    Respiratory: Normal respiratory effort.   Abdomen: not obese  Skin: Warm, dry, no rashes.  Musculoskeletal: Gait is normal.  Moves all extremities well.    Extremities: normal range of motion all extremities.   Neurological: No tremors, sensation grossly intact,  gait is normal, CN2-12 intact.  Psych:  Affect/mood is normal, judgement is good, memory is intact, grooming is appropriate.    Assessment/Plan:     Nancy was seen today for results.    Diagnoses and all orders for this visit:    Positive double stranded DNA " antibody test  -     CBC WITH DIFFERENTIAL; Future  -     US-THYROID; Future  -     Referral to Rheumatology  Thyroid antibody positive  -     CBC WITH DIFFERENTIAL; Future  -     US-THYROID; Future  -     Referral to Rheumatology    Labs been ordered to evaluate further.  Patient did have an elevated potassium level which we will recheck.  Patient also had an abnormal thyroid test.  We will obtain a thyroid ultrasound.  Double-stranded DNA testing is positive indicating the possibility of lupus.  Therefore we will refer to rheumatology for further evaluation.    Hyperkalemia  -     CBC WITH DIFFERENTIAL; Future  -     POTASSIUM SERUM (K); Future    Labs been ordered to recheck.  Will notify patient of results when received.    Rash  -     triamcinolone acetonide (KENALOG) 0.1 % Cream; Apply a small amount to affected area twice a day no more than 10 days    Neglected to provide patient with cream while in office.  We will submit to pharmacy at this time and send patient a [employer] message letting her know that this has been submitted.        No follow-ups on file.    Please note that this dictation was created using voice recognition software. I have made every reasonable attempt to correct obvious errors, but expect that there are errors of grammar and possible content that I did not discover before finalizing note.

## 2022-03-29 ENCOUNTER — HOSPITAL ENCOUNTER (OUTPATIENT)
Dept: RADIOLOGY | Facility: MEDICAL CENTER | Age: 22
End: 2022-03-29
Attending: PHYSICIAN ASSISTANT
Payer: COMMERCIAL

## 2022-03-29 DIAGNOSIS — R76.8 POSITIVE DOUBLE STRANDED DNA ANTIBODY TEST: ICD-10-CM

## 2022-03-29 DIAGNOSIS — R76.8 THYROID ANTIBODY POSITIVE: ICD-10-CM

## 2022-03-29 PROCEDURE — 76536 US EXAM OF HEAD AND NECK: CPT

## 2022-04-15 ENCOUNTER — HOSPITAL ENCOUNTER (OUTPATIENT)
Dept: LAB | Facility: MEDICAL CENTER | Age: 22
End: 2022-04-15
Attending: PHYSICIAN ASSISTANT
Payer: COMMERCIAL

## 2022-04-15 DIAGNOSIS — R76.8 THYROID ANTIBODY POSITIVE: ICD-10-CM

## 2022-04-15 DIAGNOSIS — R76.8 POSITIVE DOUBLE STRANDED DNA ANTIBODY TEST: ICD-10-CM

## 2022-04-15 DIAGNOSIS — E87.5 HYPERKALEMIA: ICD-10-CM

## 2022-04-15 LAB
BASOPHILS # BLD AUTO: 0.7 % (ref 0–1.8)
BASOPHILS # BLD: 0.05 K/UL (ref 0–0.12)
EOSINOPHIL # BLD AUTO: 0.08 K/UL (ref 0–0.51)
EOSINOPHIL NFR BLD: 1.1 % (ref 0–6.9)
ERYTHROCYTE [DISTWIDTH] IN BLOOD BY AUTOMATED COUNT: 44.9 FL (ref 35.9–50)
HCT VFR BLD AUTO: 43.2 % (ref 37–47)
HGB BLD-MCNC: 13.9 G/DL (ref 12–16)
IMM GRANULOCYTES # BLD AUTO: 0.05 K/UL (ref 0–0.11)
IMM GRANULOCYTES NFR BLD AUTO: 0.7 % (ref 0–0.9)
LYMPHOCYTES # BLD AUTO: 2.21 K/UL (ref 1–4.8)
LYMPHOCYTES NFR BLD: 30.7 % (ref 22–41)
MCH RBC QN AUTO: 27.5 PG (ref 27–33)
MCHC RBC AUTO-ENTMCNC: 32.2 G/DL (ref 33.6–35)
MCV RBC AUTO: 85.4 FL (ref 81.4–97.8)
MONOCYTES # BLD AUTO: 0.71 K/UL (ref 0–0.85)
MONOCYTES NFR BLD AUTO: 9.8 % (ref 0–13.4)
NEUTROPHILS # BLD AUTO: 4.11 K/UL (ref 2–7.15)
NEUTROPHILS NFR BLD: 57 % (ref 44–72)
NRBC # BLD AUTO: 0 K/UL
NRBC BLD-RTO: 0 /100 WBC
PLATELET # BLD AUTO: 366 K/UL (ref 164–446)
PMV BLD AUTO: 9.1 FL (ref 9–12.9)
POTASSIUM SERPL-SCNC: 4.6 MMOL/L (ref 3.6–5.5)
RBC # BLD AUTO: 5.06 M/UL (ref 4.2–5.4)
WBC # BLD AUTO: 7.2 K/UL (ref 4.8–10.8)

## 2022-04-15 PROCEDURE — 84132 ASSAY OF SERUM POTASSIUM: CPT

## 2022-04-15 PROCEDURE — 85025 COMPLETE CBC W/AUTO DIFF WBC: CPT

## 2022-04-15 PROCEDURE — 36415 COLL VENOUS BLD VENIPUNCTURE: CPT

## 2022-04-19 ENCOUNTER — OFFICE VISIT (OUTPATIENT)
Dept: MEDICAL GROUP | Facility: CLINIC | Age: 22
End: 2022-04-19
Payer: COMMERCIAL

## 2022-04-19 VITALS
WEIGHT: 150 LBS | HEIGHT: 69 IN | BODY MASS INDEX: 22.22 KG/M2 | HEART RATE: 62 BPM | DIASTOLIC BLOOD PRESSURE: 62 MMHG | OXYGEN SATURATION: 100 % | SYSTOLIC BLOOD PRESSURE: 100 MMHG | RESPIRATION RATE: 16 BRPM | TEMPERATURE: 98.6 F

## 2022-04-19 DIAGNOSIS — Q84.6 NAIL ANOMALY: ICD-10-CM

## 2022-04-19 DIAGNOSIS — B35.1 NAIL FUNGUS: ICD-10-CM

## 2022-04-19 PROCEDURE — 99213 OFFICE O/P EST LOW 20 MIN: CPT | Performed by: PHYSICIAN ASSISTANT

## 2022-04-19 RX ORDER — TERBINAFINE HYDROCHLORIDE 250 MG/1
250 TABLET ORAL DAILY
Qty: 30 TABLET | Refills: 0 | Status: SHIPPED | OUTPATIENT
Start: 2022-04-19 | End: 2024-02-08

## 2022-04-19 ASSESSMENT — FIBROSIS 4 INDEX: FIB4 SCORE: 0.28

## 2022-04-19 NOTE — PROGRESS NOTES
"cc:  Nail problems    Subjective:     Nancy Nesbitt is a 21 y.o. female presenting for nail problems      Patient presents to the office for nail problems.  Patient indicates some peeling of nails at the bases for the last several weeks.  She does not work around any specific chemicals that would contribute to making symptoms worse.  She does indicate that this is quite painful for her.  Nails seem to be tearing at the bases and midway up the nailbed.  Nothing specific is helped.  This is a new problem for her.  She does acknowledge that she is still trying to make an appointment with rheumatology.    Review of systems:  See above.   Denies any symptoms unless previously indicated.        Current Outpatient Medications:   •  terbinafine (LAMISIL) 250 MG Tab, Take 1 Tablet by mouth every day., Disp: 30 Tablet, Rfl: 0    Allergies, past medical history, past surgical history, family history, social history reviewed and updated    Objective:     Vitals: /62 (BP Location: Left arm, Patient Position: Sitting, BP Cuff Size: Adult)   Pulse 62   Temp 37 °C (98.6 °F) (Temporal)   Resp 16   Ht 1.753 m (5' 9\")   Wt 68 kg (150 lb)   SpO2 100%   BMI 22.15 kg/m²   General: Alert, pleasant, NAD  EYES:   PERRL, EOMI, no icterus or pallor.  Conjunctivae and lids normal.   HENT:  Normocephalic.  External ears normal. Neck supple.     Respiratory: Normal respiratory effort.    Abdomen: Not obese  Skin: Warm, dry, no rashes.  Multiple nails are peeling at the bases.  They seem to be tearing away from the nailbed.  New nail seem to be growing underneath.  They do appear inflamed.  Possible nail fungus right great toe.  Musculoskeletal: Gait is normal.  Moves all extremities well.    Neurological: No tremors, sensation grossly intact, CN2-12 intact.  Psych:  Affect/mood is normal, judgement is good, memory is intact, grooming is appropriate.    Assessment/Plan:     Nancy was seen today for nail problem.    Diagnoses " and all orders for this visit:    Nail fungus  -     Referral to Dermatology  -     terbinafine (LAMISIL) 250 MG Tab; Take 1 Tablet by mouth every day.    Nail anomaly  -     VITAMIN B1; Future  -     VITAMIN B2 (RIBOFLAVIN); Future  -     VITAMIN B6; Future  -     VITAMIN B12; Future  -     IRON/TOTAL IRON BIND; Future  -     FOLATE; Future  -     VITAMIN C SERUM; Future  -     Referral to Dermatology      Etiology of this is unknown.  We are still trying to determine if patient has an autoimmune problem.  This would be a consistent issue for her lupus.  However we will refer to dermatology for further evaluation.  Will obtain vitamin levels.  We will try patient on terbinafine for nail fungus and will follow any recommendations dermatology may have.      No follow-ups on file.    Please note that this dictation was created using voice recognition software. I have made every reasonable attempt to correct obvious errors, but expect that there are errors of grammar and possible content that I did not discover before finalizing note.

## 2022-04-20 ENCOUNTER — PATIENT MESSAGE (OUTPATIENT)
Dept: MEDICAL GROUP | Facility: CLINIC | Age: 22
End: 2022-04-20
Payer: COMMERCIAL

## 2022-04-20 DIAGNOSIS — Q84.6 NAIL ANOMALY: ICD-10-CM

## 2022-04-20 DIAGNOSIS — B35.1 NAIL FUNGUS: ICD-10-CM

## 2022-04-20 NOTE — PROGRESS NOTES
Patient states referral was denied but it does not appear to have been processed.  Can we contact referrals department to find out what is going on with the referral.

## 2022-04-21 ENCOUNTER — PATIENT MESSAGE (OUTPATIENT)
Dept: MEDICAL GROUP | Facility: CLINIC | Age: 22
End: 2022-04-21
Payer: COMMERCIAL

## 2022-04-21 DIAGNOSIS — Z80.8 FAMILY HISTORY OF THYROID CANCER: ICD-10-CM

## 2022-04-27 ENCOUNTER — APPOINTMENT (RX ONLY)
Dept: URBAN - METROPOLITAN AREA CLINIC 31 | Facility: CLINIC | Age: 22
Setting detail: DERMATOLOGY
End: 2022-04-27

## 2022-04-27 ENCOUNTER — PATIENT MESSAGE (OUTPATIENT)
Dept: MEDICAL GROUP | Facility: CLINIC | Age: 22
End: 2022-04-27
Payer: COMMERCIAL

## 2022-04-27 DIAGNOSIS — R76.8 POSITIVE DOUBLE STRANDED DNA ANTIBODY TEST: ICD-10-CM

## 2022-04-27 DIAGNOSIS — L30.1 DYSHIDROSIS [POMPHOLYX]: ICD-10-CM | Status: STABLE

## 2022-04-27 DIAGNOSIS — L60.3 NAIL DYSTROPHY: ICD-10-CM

## 2022-04-27 DIAGNOSIS — Q84.6 NAIL ANOMALY: ICD-10-CM

## 2022-04-27 DIAGNOSIS — M25.50 ARTHRALGIA, UNSPECIFIED JOINT: ICD-10-CM

## 2022-04-27 DIAGNOSIS — R21 RASH: ICD-10-CM

## 2022-04-27 PROCEDURE — ? PRESCRIPTION

## 2022-04-27 PROCEDURE — 99203 OFFICE O/P NEW LOW 30 MIN: CPT

## 2022-04-27 PROCEDURE — ? COUNSELING

## 2022-04-27 PROCEDURE — ? ADDITIONAL NOTES

## 2022-04-27 RX ORDER — BETAMETHASONE DIPROPIONATE 0.5 MG/G
CREAM TOPICAL BID
Qty: 45 | Refills: 6 | Status: ERX | COMMUNITY
Start: 2022-04-27

## 2022-04-27 RX ADMIN — BETAMETHASONE DIPROPIONATE: 0.5 CREAM TOPICAL at 00:00

## 2022-04-27 ASSESSMENT — LOCATION DETAILED DESCRIPTION DERM
LOCATION DETAILED: LEFT INDEX FINGERNAIL
LOCATION DETAILED: RIGHT MIDDLE FINGERNAIL
LOCATION DETAILED: LEFT RADIAL PALM
LOCATION DETAILED: LEFT MIDDLE FINGERNAIL
LOCATION DETAILED: RIGHT GREAT TOENAIL
LOCATION DETAILED: RIGHT INDEX FINGERNAIL
LOCATION DETAILED: RIGHT ULNAR PALM

## 2022-04-27 ASSESSMENT — LOCATION SIMPLE DESCRIPTION DERM
LOCATION SIMPLE: LEFT HAND
LOCATION SIMPLE: RIGHT GREAT TOE
LOCATION SIMPLE: RIGHT MIDDLE FINGERNAIL
LOCATION SIMPLE: LEFT MIDDLE FINGERNAIL
LOCATION SIMPLE: RIGHT INDEX FINGERNAIL
LOCATION SIMPLE: LEFT INDEX FINGERNAIL
LOCATION SIMPLE: RIGHT HAND

## 2022-04-27 ASSESSMENT — LOCATION ZONE DERM
LOCATION ZONE: HAND
LOCATION ZONE: TOENAIL
LOCATION ZONE: FINGERNAIL

## 2022-04-27 NOTE — PROCEDURE: ADDITIONAL NOTES
Detail Level: Detailed
Additional Notes: Nailfold inflammation in concert with + EDWARD is suggestive of Lupus.\\n\\nPt also with thyroid condition, which may be autoimmune in nature. I will defer to Rheumatology and Endocrinology for management of systemic disease. For now, pt can also use betamethasone on nailfolds should they become uncomfortable again.\\n\\nPt instructed to contact her PCP for help with the Rheumatology referral PCP has already sent.
Render Risk Assessment In Note?: no

## 2022-05-14 NOTE — PATIENT INSTRUCTIONS
AFTER VISIT INSTRUCTIONS    Below are important information to help you navigate your healthcare needs and help us serve you safely and effectively:  If laboratory tests and/or imaging studies were ordered, remember to go get them done.  If new prescriptions or refills were sent to the pharmacy, remember to go pick them up.  Take your medications exactly as prescribed unless instructed otherwise.  Follow up with your primary care provider and/or specialists as scheduled.  If there are significant findings from your lab tests and imaging studies, I will notify you with explanations via 27 bardst or phone call, otherwise you can view them on [employer] and let me know if you have any questions.  Sign up for [employer] if you have not already done so, in order to have access to the results of your lab tests and imaging studies, and to be able to send and receive messages from me.  Please note that [employer] messaging is for non-urgent matters that do not require immediate attention and are typically read only during office hours, so do not expect immediate responses from me.

## 2022-05-14 NOTE — PROGRESS NOTES
[facility]  [address], [address], [place] [address]  Phone: [phone] / Fax: [phone]    RHEUMATOLOGY NEW PATIENT VISIT NOTE      DATE OF SERVICE: 05/16/2022    REFERRING PROVIDER:  Linda Alanis P.A.-C.  [address] [id number]      SUBJECTIVE:     CHIEF COMPLAINT:   Chief Complaint   Patient presents with   • New Patient     Positive double stranded DNA antibody test       HISTORY OF PRESENT ILLNESS:  Nancy Nesbitt is a 21 y.o. female who presents for evaluation of hand rash, fingernail/toenail abnormalities, and shoulder pain in the setting of positive NASRA. Reports onset of symptoms in 3/2022 with episodic red blistering rash/swelling of hands, splitting of fingernails/toenails, and bilateral shoulder pain. She also notes poor appetite, unintentional weight loss, body aches, generalized weakness, numbness, fatigue, insomnia, headaches, occasional skin photosensitivity on face and occasional chest pain with deep breathing. She has been treated with empiric oral terbinafine for possible nail fungal infection and was seen by a dermatologist who prescribed topical steroid and recommended further evaluation by rheumatology.  Pertinent labs as of 3/2022: Positive NASRA with negative IFA <1:80, positive anti-dsDNA 83 with negative titer <1:10, positive anti-TPO; negative/normal RF, anti-TG, TSH w/fT4, ESR, CRP, C3, C4, urinalysis, CMP, and unremarkable CBC.    REVIEW OF SYSTEMS:  Except as noted in the history above, a complete review of systems with emphasis on autoimmune inflammatory conditions was otherwise negative for any significant symptoms.      ACTIVE PROBLEM LIST:  Patient Active Problem List   Diagnosis   • Cutaneous eruption   • Joint pain   • Hyperkalemia   • Positive NASRA (antinuclear antibody)   • Thyroid antibody positive   • Nail fungus   • Nail anomaly   • Bilateral shoulder pain   • Fingernail abnormalities   No  "problem-specific Assessment & Plan notes found for this encounter.      PAST MEDICAL HISTORY:  No past medical history on file.    PAST SURGICAL HISTORY:  Past Surgical History:   Procedure Laterality Date   • OTHER      oral surgery as a child.   • SINUSOTOMIES         MEDICATIONS:  Current Outpatient Medications   Medication Sig Dispense Refill   • terbinafine (LAMISIL) 250 MG Tab Take 1 Tablet by mouth every day. (Patient not taking: Reported on 5/16/2022) 30 Tablet 0     No current facility-administered medications for this visit.       ALLERGIES:   Allergies   Allergen Reactions   • Latex      Hives        IMMUNIZATIONS:  Immunization History   Administered Date(s) Administered   • Dtap Vaccine 04/13/1999, 05/26/1999, 01/19/2000   • Hepatitis B Vaccine Adolescent/Pediatric 04/13/1999, 05/26/1999, 01/19/2000   • Hib Vaccine (Prp-d) - HISTORICAL DATA 04/13/1999, 05/26/1999, 01/19/2000   • IPV 04/13/1999, 05/26/1999, 01/19/2000   • MMR Vaccine 01/19/2000       SOCIAL HISTORY:   Social History     Tobacco Use   • Smoking status: Current Some Day Smoker     Types: Cigarettes   • Smokeless tobacco: Current User     Types: Chew   Vaping Use   • Vaping Use: Never used   Substance Use Topics   • Alcohol use: Yes     Comment: very rare   • Drug use: Yes     Types: Marijuana     Comment: occasional       FAMILY HISTORY:  Family History   Problem Relation Age of Onset   • Diabetes Mother    • Diabetes Father         OBJECTIVE:     Vital Signs: BP (!) 94/60 (BP Location: Left arm, Patient Position: Sitting, BP Cuff Size: Adult)   Pulse (!) 52   Temp 36.8 °C (98.3 °F) (Temporal)   Resp 16   Ht 1.753 m (5' 9\")   Wt 65.8 kg (145 lb)   SpO2 98% Body mass index is 21.41 kg/m².    General: Appears well and comfortable; no acute distress  Eyes: Extraocular muscles and vision intact; no conjunctival lesions  ENT: Oral mucosa pink and moist; no oral or nasal lesions  Head/Neck: No scalp lesions; neck supple with no " lymphadenopathy  Cardiovascular: Regular rate and rhythm; no murmurs or pericardial rubs  Respiratory: Clear to auscultation bilaterally; no wheezes, rales, or pleural rubs  Gastrointestinal: Abdomen soft and non-tender; no masses or organomegaly  Integumentary: Nail dystrophy of several fingers and right big toe; skin soft and supple; no significant cutaneous lesions  Musculoskeletal: ROM of shoulders limited by pain; no significant joint tenderness, periarticular soft tissue swelling, warmth, erythema, or overt signs of synovitis; no significant restriction in ROM of spinal and peripheral joints  Neurologic: No focal sensory or motor deficits  Psychiatric: Mood and affect appropriate      LABORATORY RESULTS REVIEWED AND INTERPRETED BY ME:  Lab Results   Component Value Date/Time    WBC 7.2 04/15/2022 08:50 AM    RBC 5.06 04/15/2022 08:50 AM    HEMOGLOBIN 13.9 04/15/2022 08:50 AM    HEMATOCRIT 43.2 04/15/2022 08:50 AM    MCV 85.4 04/15/2022 08:50 AM    MCH 27.5 04/15/2022 08:50 AM    MCHC 32.2 (L) 04/15/2022 08:50 AM    RDW 44.9 04/15/2022 08:50 AM    PLATELETCT 366 04/15/2022 08:50 AM    MPV 9.1 04/15/2022 08:50 AM    NEUTS 4.11 04/15/2022 08:50 AM    LYMPHOCYTES 30.70 04/15/2022 08:50 AM    MONOCYTES 9.80 04/15/2022 08:50 AM    EOSINOPHILS 1.10 04/15/2022 08:50 AM    BASOPHILS 0.70 04/15/2022 08:50 AM     Lab Results   Component Value Date/Time    SODIUM 140 03/11/2022 09:00 AM    POTASSIUM 4.6 04/15/2022 08:50 AM    CHLORIDE 104 03/11/2022 09:00 AM    CO2 26 03/11/2022 09:00 AM    GLUCOSE 80 03/11/2022 09:00 AM    BUN 13 03/11/2022 09:00 AM    CREATININE 0.67 03/11/2022 09:00 AM     Lab Results   Component Value Date/Time    ASTSGOT 15 03/11/2022 09:00 AM    ALTSGPT 11 03/11/2022 09:00 AM    ALKPHOSPHAT 61 03/11/2022 09:00 AM    TBILIRUBIN 0.3 03/11/2022 09:00 AM    TOTPROTEIN 7.0 03/11/2022 09:00 AM    ALBUMIN 4.6 03/11/2022 09:00 AM    CALCIUM 9.9 03/11/2022 09:00 AM     Lab Results   Component Value Date/Time     SEDRATEWES 3 03/11/2022 09:00 AM     Lab Results   Component Value Date/Time    RHEUMFACTN <10 03/11/2022 09:00 AM     Lab Results   Component Value Date/Time    ANTINUCAB Detected (A) 03/11/2022 09:00 AM     Lab Results   Component Value Date/Time    ANTIDNADS 83 (H) 03/11/2022 09:00 AM    DSDNA <1:10 03/11/2022 09:00 AM    SMITHAB 0 03/11/2022 09:00 AM    RNPAB 3 03/11/2022 09:00 AM    SSA60 0 03/11/2022 09:00 AM    SSA52 0 03/11/2022 09:00 AM    ANTISSBSJ 0 03/11/2022 09:00 AM     Lab Results   Component Value Date/Time    L0GXMOGVIYB 104.5 03/11/2022 09:00 AM    Y0SQGDIDOOA 19.7 03/11/2022 09:00 AM     Lab Results   Component Value Date/Time    COLORURINE Yellow 03/11/2022 08:59 AM    SPECGRAVITY 1.019 03/11/2022 08:59 AM    PHURINE 7.5 03/11/2022 08:59 AM    GLUCOSEUR Negative 03/11/2022 08:59 AM    KETONES Negative 03/11/2022 08:59 AM    PROTEINURIN Negative 03/11/2022 08:59 AM     Lab Results   Component Value Date/Time    TSHULTRASEN 1.120 03/11/2022 09:00 AM    FREET4 1.46 03/11/2022 09:00 AM    ANTITHYROGL <0.9 03/11/2022 09:00 AM    MICROSOMALA 16.0 (H) 03/11/2022 09:00 AM       RADIOLOGY RESULTS REVIEWED AND INTERPRETED BY ME:    Results for orders placed in visit on 07/29/21    DX-WRIST-COMPLETE 3+ RIGHT    Impression  No radiographic evidence of acute traumatic bone injury.    Possible small metallic foreign body on or in the soft tissues at the level of the proximal lateral metacarpals.      All relevant laboratory and imaging results reported on this note were reviewed and interpreted by me.      ASSESSMENT AND PLAN:     Nancy Nesbitt is a 21 y.o. female with history as noted above whose presentation merits the following diagnostic and clinical status impressions and recommendations:    1. Positive NASRA (anti-dsDNA)  There is insufficient objective historical, physical, and laboratory evidence to suggest the presence of an underlying NASRA-associated autoimmune disease, such as systemic lupus,  Sjogren syndrome, inflammatory myopathy, or systemic sclerosis. Notably, the NASRA test is highly sensitive and lacks diagnostic specificity as it can be seen in a variety of non-rheumatic conditions, such as autoimmune thyroid disease with positive anti-TPO antibody (especially Hashimoto's thyroiditis), acute or chronic infections (especially viral), and malignancy (especially lymphoma), as well as in over 10% of healthy individuals with no clinical evidence of autoimmune rheumatic disease. In any case, it must be interpreted in the context of the overall clinical picture with close attention to disease-specific manifestations. That said, the presence of persistently positive NASRA, especially in high or rising titers, does confer some risk of NASRA-associated disease, so if unexplainable inflammatory symptoms develop and/or persist in such a case, clinical follow-up for re-evaluation may be reasonable.  - No further immunologic lab testing necessary at this time    2. Nail abnormalities  Etiology unclear but important differentials include onychomycosis with onycholysis, nail lichen planus, nail psoriasis, chronic inflammation of nail matrix, and idiopathic nail atrophy.  - HLA-B27; Future  - Recommend biopsy/testing of fingernail/toenail lesion for fungal infection    3. Cutaneous eruption  Etiology unclear but important differentials include fungal infection, palmar psoriasis, and chilblains or pernio.  - HLA-B27; Future  - Consider biopsy/testing of skin lesion for fungal infection    4. Bilateral shoulder pain, unspecified chronicity  In the setting of psoriasis on the differential, psoriatic arthritis is also on the differential.  - HLA-B27; Future  - Additional recommendation may follow depending on the above risk stratification test result    FOLLOW-UP: Return in about 3 months (around 8/16/2022) for Short.           Thank you for giving me the opportunity to participate in the care of Nancy Maza  Laz.    Faheem Acosta MD, MS  Rheumatologist, [facility]  , Department of Internal Medicine  [facility]

## 2022-05-16 ENCOUNTER — OFFICE VISIT (OUTPATIENT)
Dept: RHEUMATOLOGY | Facility: MEDICAL CENTER | Age: 22
End: 2022-05-16
Payer: COMMERCIAL

## 2022-05-16 VITALS
RESPIRATION RATE: 16 BRPM | OXYGEN SATURATION: 98 % | BODY MASS INDEX: 21.48 KG/M2 | HEART RATE: 52 BPM | TEMPERATURE: 98.3 F | DIASTOLIC BLOOD PRESSURE: 60 MMHG | SYSTOLIC BLOOD PRESSURE: 94 MMHG | HEIGHT: 69 IN | WEIGHT: 145 LBS

## 2022-05-16 DIAGNOSIS — R21 CUTANEOUS ERUPTION: ICD-10-CM

## 2022-05-16 DIAGNOSIS — M25.512 BILATERAL SHOULDER PAIN, UNSPECIFIED CHRONICITY: ICD-10-CM

## 2022-05-16 DIAGNOSIS — M25.511 BILATERAL SHOULDER PAIN, UNSPECIFIED CHRONICITY: ICD-10-CM

## 2022-05-16 DIAGNOSIS — R76.8 POSITIVE ANA (ANTINUCLEAR ANTIBODY): ICD-10-CM

## 2022-05-16 DIAGNOSIS — L60.9 NAIL ABNORMALITIES: ICD-10-CM

## 2022-05-16 PROCEDURE — 99204 OFFICE O/P NEW MOD 45 MIN: CPT | Performed by: STUDENT IN AN ORGANIZED HEALTH CARE EDUCATION/TRAINING PROGRAM

## 2022-05-16 ASSESSMENT — FIBROSIS 4 INDEX: FIB4 SCORE: 0.28

## 2022-05-16 ASSESSMENT — PAIN SCALES - GENERAL: PAINLEVEL: 5=MODERATE PAIN

## 2022-06-02 ENCOUNTER — PATIENT MESSAGE (OUTPATIENT)
Dept: MEDICAL GROUP | Facility: CLINIC | Age: 22
End: 2022-06-02
Payer: COMMERCIAL

## 2022-06-02 DIAGNOSIS — M25.50 ARTHRALGIA, UNSPECIFIED JOINT: ICD-10-CM

## 2022-06-02 DIAGNOSIS — Q84.6 NAIL ANOMALY: ICD-10-CM

## 2022-06-02 DIAGNOSIS — R76.8 POSITIVE ANA (ANTINUCLEAR ANTIBODY): ICD-10-CM

## 2022-08-18 ENCOUNTER — APPOINTMENT (OUTPATIENT)
Dept: RHEUMATOLOGY | Facility: MEDICAL CENTER | Age: 22
End: 2022-08-18
Attending: STUDENT IN AN ORGANIZED HEALTH CARE EDUCATION/TRAINING PROGRAM
Payer: COMMERCIAL

## 2023-03-17 ENCOUNTER — OFFICE VISIT (OUTPATIENT)
Dept: URGENT CARE | Facility: PHYSICIAN GROUP | Age: 23
End: 2023-03-17
Payer: COMMERCIAL

## 2023-03-17 ENCOUNTER — APPOINTMENT (OUTPATIENT)
Dept: RADIOLOGY | Facility: IMAGING CENTER | Age: 23
End: 2023-03-17
Payer: COMMERCIAL

## 2023-03-17 VITALS
SYSTOLIC BLOOD PRESSURE: 130 MMHG | HEART RATE: 82 BPM | HEIGHT: 69 IN | DIASTOLIC BLOOD PRESSURE: 82 MMHG | WEIGHT: 145 LBS | RESPIRATION RATE: 18 BRPM | OXYGEN SATURATION: 96 % | TEMPERATURE: 97.6 F | BODY MASS INDEX: 21.48 KG/M2

## 2023-03-17 DIAGNOSIS — M25.571 ACUTE RIGHT ANKLE PAIN: ICD-10-CM

## 2023-03-17 PROCEDURE — 99214 OFFICE O/P EST MOD 30 MIN: CPT

## 2023-03-17 PROCEDURE — A6449 LT COMPRES BAND >=3" <5"/YD: HCPCS

## 2023-03-17 PROCEDURE — 73610 X-RAY EXAM OF ANKLE: CPT | Mod: TC,FY,RT | Performed by: RADIOLOGY

## 2023-03-17 RX ORDER — ACETAMINOPHEN 500 MG
500 TABLET ORAL ONCE
Status: COMPLETED | OUTPATIENT
Start: 2023-03-17 | End: 2023-03-17

## 2023-03-17 RX ADMIN — Medication 500 MG: at 16:58

## 2023-03-17 ASSESSMENT — FIBROSIS 4 INDEX: FIB4 SCORE: 0.3

## 2023-03-17 ASSESSMENT — PAIN SCALES - GENERAL: PAINLEVEL: 7=MODERATE-SEVERE PAIN

## 2023-03-17 NOTE — PROGRESS NOTES
Chief Complaint   Patient presents with    Leg Injury     Stepped off curb, abt 10am this morning. Right ankle heard a pop, now swollen and painful.         HISTORY OF PRESENT ILLNESS: Patient is a pleasant 22 y.o. female who presents to urgent care today right ankle pain after stepping off a curb this morning.  She took Motrin with no relief from the pain.  She does note some swelling to the right lateral outer part of her ankle.  No deformity.  She is able to ambulate but with much discomfort.    Patient Active Problem List    Diagnosis Date Noted    Bilateral shoulder pain 05/16/2022    Nail abnormalities 05/16/2022    Nail fungus 04/19/2022    Nail anomaly 04/19/2022    Hyperkalemia 03/23/2022    Positive NASRA (antinuclear antibody) 03/23/2022    Thyroid antibody positive 03/23/2022    Cutaneous eruption 03/10/2022    Joint pain 03/10/2022       Allergies:Latex    Current Outpatient Medications Ordered in Epic   Medication Sig Dispense Refill    terbinafine (LAMISIL) 250 MG Tab Take 1 Tablet by mouth every day. (Patient not taking: Reported on 5/16/2022) 30 Tablet 0     No current Epic-ordered facility-administered medications on file.       No past medical history on file.    Social History     Tobacco Use    Smoking status: Some Days     Types: Cigarettes    Smokeless tobacco: Current     Types: Chew   Vaping Use    Vaping Use: Never used   Substance Use Topics    Alcohol use: Yes     Comment: very rare    Drug use: Yes     Types: Marijuana     Comment: occasional       Family Status   Relation Name Status    Mo  (Not Specified)    Fa  Other        thyroid problems on dad's side. hemophilia     Family History   Problem Relation Age of Onset    Diabetes Mother     Diabetes Father        ROS:  Review of Systems   Constitutional: Negative for fever, chills, weight loss, malaise, and fatigue.   HENT: Negative for ear pain, nosebleeds, congestion, sore throat and neck pain.    Eyes: Negative for vision changes.  "  Neuro: Negative for headache, sensory changes, weakness, seizure, LOC.   Cardiovascular: Negative for chest pain, palpitations, orthopnea and leg swelling.   Respiratory: Negative for cough, sputum production, shortness of breath and wheezing.   Gastrointestinal: Negative for abdominal pain, nausea, vomiting or diarrhea.   Genitourinary: Negative for dysuria, urgency and frequency.  Musculoskeletal: Negative for falls, neck pain, back pain, joint pain, myalgias.  Right ankle pain.  Some swelling noted at the right lateral ankle, no bruising, she is able to ambulate  Skin: Negative for rash, diaphoresis.     Exam:  /82   Pulse 82   Temp 36.4 °C (97.6 °F) (Temporal)   Resp 18   Ht 1.753 m (5' 9\")   Wt 65.8 kg (145 lb)   SpO2 96%   General: well-nourished, well-developed female in NAD  Head: normocephalic, atraumatic  Eyes: PERRLA, no conjunctival injection, acuity grossly intact, lids normal.  Ears: normal shape and symmetry, no tenderness, no discharge. External canals are without any significant edema or erythema. Tympanic membranes are without any inflammation, no effusion. Gross auditory acuity is intact.  Nose: symmetrical without tenderness, no discharge.  Mouth/Throat: reasonable hygiene, no erythema, exudates or tonsillar enlargement.  Neck: no masses, range of motion within normal limits, no tracheal deviation. No obvious thyroid enlargement.   Lymph: no cervical adenopathy. No supraclavicular adenopathy.   Neuro: alert and oriented. Cranial nerves 1-12 grossly intact. No sensory deficit.   Cardiovascular: regular rate and rhythm. No edema.  Pulmonary: no distress. Chest is symmetrical with respiration, no wheezes, crackles, or rhonchi.   Abdomen: soft, non-tender, no guarding, no hepatosplenomegaly.  Musculoskeletal: no clubbing, appropriate muscle tone, gait is stable.  Pain noted to palpation on the right ankle.  She does have noted swelling to the right lateral ankle.  No bruising.  No major " deformity.  She is able to ambulate.  Skin: warm, dry, intact, no clubbing, no cyanosis, no rashes.   Psych: appropriate mood, affect, judgement.         Assessment/Plan:  1. Acute right ankle pain  DX-ANKLE 3+ VIEWS RIGHT        Patient is a pleasant 22-year-old female who comes in today after stepping off a curb this morning and twisting her right ankle.  She noted instant pain to the site.  She took Motrin for the pain with little relief.  On assessment she does have right lateral swelling to her ankle.  There is no major deformity.  No redness.  She does have pain with palpation.  She is able to move all of her toes and her foot.  She is able to ambulate with some discomfort.  Tylenol was given here in the office for comfort measures.  An Ace bandage was applied as well.  Imaging does not appear to show any fractures at this time.    Imaging of the right ankle is negative for fracture at this time.  Patient was advised of precautions to take, movement was encouraged, Motrin and Tylenol for the pain.  An Ace bandage was applied for comfort measures.    Supportive care, differential diagnoses, and indications for immediate follow-up discussed with patient.   Pathogenesis of diagnosis discussed including typical length and natural progression.   Instructed to return to clinic or nearest emergency department for any change in condition, further concerns, or worsening of symptoms.  Patient states understanding of the plan of care and discharge instructions.  Instructed to make an appointment, for follow up, with  primary care provider.        Please note that this dictation was created using voice recognition software. I have made every reasonable attempt to correct obvious errors, but I expect that there are errors of grammar and possibly content that I did not discover before finalizing the note.      Aubree HOWELL

## 2023-11-19 ENCOUNTER — NON-PROVIDER VISIT (OUTPATIENT)
Dept: URGENT CARE | Facility: PHYSICIAN GROUP | Age: 23
End: 2023-11-19

## 2023-11-19 DIAGNOSIS — Z02.1 PRE-EMPLOYMENT DRUG SCREENING: ICD-10-CM

## 2023-11-19 LAB
AMP AMPHETAMINE: NORMAL
COC COCAINE: NORMAL
INT CON NEG: NORMAL
INT CON POS: NORMAL
MET METHAMPHETAMINES: NORMAL
OPI OPIATES: NORMAL
PCP PHENCYCLIDINE: NORMAL
POC DRUG COMMENT 753798-OCCUPATIONAL HEALTH: NORMAL
THC: NORMAL

## 2023-11-19 PROCEDURE — 80305 DRUG TEST PRSMV DIR OPT OBS: CPT | Performed by: FAMILY MEDICINE

## 2024-01-12 ENCOUNTER — OFFICE VISIT (OUTPATIENT)
Dept: URGENT CARE | Facility: PHYSICIAN GROUP | Age: 24
End: 2024-01-12
Payer: COMMERCIAL

## 2024-01-12 VITALS
RESPIRATION RATE: 16 BRPM | WEIGHT: 188 LBS | HEIGHT: 69 IN | TEMPERATURE: 97.7 F | DIASTOLIC BLOOD PRESSURE: 78 MMHG | OXYGEN SATURATION: 98 % | HEART RATE: 67 BPM | BODY MASS INDEX: 27.85 KG/M2 | SYSTOLIC BLOOD PRESSURE: 134 MMHG

## 2024-01-12 DIAGNOSIS — R11.2 NAUSEA AND VOMITING IN ADULT: ICD-10-CM

## 2024-01-12 DIAGNOSIS — R10.31 RIGHT LOWER QUADRANT ABDOMINAL PAIN: Primary | ICD-10-CM

## 2024-01-12 LAB
APPEARANCE UR: NORMAL
BILIRUB UR STRIP-MCNC: NEGATIVE MG/DL
COLOR UR AUTO: NORMAL
GLUCOSE UR STRIP.AUTO-MCNC: NEGATIVE MG/DL
KETONES UR STRIP.AUTO-MCNC: NEGATIVE MG/DL
LEUKOCYTE ESTERASE UR QL STRIP.AUTO: NORMAL
NITRITE UR QL STRIP.AUTO: NEGATIVE
PH UR STRIP.AUTO: 5.5 [PH] (ref 5–8)
POCT INT CON NEG: NEGATIVE
POCT INT CON POS: POSITIVE
POCT URINE PREGNANCY TEST: NEGATIVE
PROT UR QL STRIP: NEGATIVE MG/DL
RBC UR QL AUTO: NEGATIVE
SP GR UR STRIP.AUTO: 1.02
UROBILINOGEN UR STRIP-MCNC: 1 MG/DL

## 2024-01-12 PROCEDURE — 81025 URINE PREGNANCY TEST: CPT | Performed by: NURSE PRACTITIONER

## 2024-01-12 PROCEDURE — 3078F DIAST BP <80 MM HG: CPT | Performed by: NURSE PRACTITIONER

## 2024-01-12 PROCEDURE — 3075F SYST BP GE 130 - 139MM HG: CPT | Performed by: NURSE PRACTITIONER

## 2024-01-12 PROCEDURE — 81002 URINALYSIS NONAUTO W/O SCOPE: CPT | Performed by: NURSE PRACTITIONER

## 2024-01-12 PROCEDURE — 99214 OFFICE O/P EST MOD 30 MIN: CPT | Performed by: NURSE PRACTITIONER

## 2024-01-12 RX ORDER — ONDANSETRON 4 MG/1
4 TABLET, ORALLY DISINTEGRATING ORAL ONCE
Status: COMPLETED | OUTPATIENT
Start: 2024-01-12 | End: 2024-01-12

## 2024-01-12 RX ADMIN — ONDANSETRON 4 MG: 4 TABLET, ORALLY DISINTEGRATING ORAL at 16:45

## 2024-01-12 ASSESSMENT — FIBROSIS 4 INDEX: FIB4 SCORE: 0.31

## 2024-01-12 ASSESSMENT — ENCOUNTER SYMPTOMS
DIARRHEA: 1
VOMITING: 1
ABDOMINAL PAIN: 1
NAUSEA: 1

## 2024-01-13 NOTE — PROGRESS NOTES
"Subjective:     Nancy Nesbitt is a 23 y.o. female who presents for LLQ Pain (X 3 days with nausea, vomiting, diarrhea, chills,    LLQ Pain  Associated symptoms include diarrhea, nausea and vomiting.   Sinusitis      Pt presents for evaluation of a new problem.  Jaycee is a very pleasant 23-year-old female presents urgent care today with complaints of lower abdominal pain that has been ongoing for the past 2 days.  She does note nausea, vomiting and diarrhea.  She states that she has been unable to keep anything down for the past 2 days.  Negative for fever.  Her pain has progressively worsened.  No previous abdominal surgeries.  She denies dysuria, urgency or frequency.    Review of Systems   Gastrointestinal:  Positive for abdominal pain, diarrhea, nausea and vomiting.       PMH: No past medical history on file.  ALLERGIES:   Allergies   Allergen Reactions    Latex      Hives      SURGHX:   Past Surgical History:   Procedure Laterality Date    OTHER      oral surgery as a child.    SINUSOTOMIES       SOCHX:   Social History     Socioeconomic History    Marital status: Single   Tobacco Use    Smoking status: Some Days     Types: Cigarettes    Smokeless tobacco: Current     Types: Chew   Vaping Use    Vaping Use: Never used   Substance and Sexual Activity    Alcohol use: Yes     Comment: very rare    Drug use: Yes     Types: Marijuana     Comment: occasional     FH:   Family History   Problem Relation Age of Onset    Diabetes Mother     Diabetes Father          Objective:   /78   Pulse 67   Temp 36.5 °C (97.7 °F) (Temporal)   Resp 16   Ht 1.753 m (5' 9\")   Wt 85.3 kg (188 lb)   SpO2 98%   BMI 27.76 kg/m²     Physical Exam  Vitals and nursing note reviewed.   Constitutional:       General: She is not in acute distress.     Appearance: Normal appearance. She is not ill-appearing.   HENT:      Head: Normocephalic and atraumatic.      Right Ear: External ear normal.      Left Ear: External ear normal. "      Nose: No congestion or rhinorrhea.      Mouth/Throat:      Mouth: Mucous membranes are moist.   Eyes:      Extraocular Movements: Extraocular movements intact.      Pupils: Pupils are equal, round, and reactive to light.   Cardiovascular:      Rate and Rhythm: Normal rate and regular rhythm.      Pulses: Normal pulses.      Heart sounds: Normal heart sounds.   Pulmonary:      Effort: Pulmonary effort is normal.      Breath sounds: Normal breath sounds.   Abdominal:      General: Abdomen is flat. Bowel sounds are normal.      Palpations: Abdomen is soft.      Tenderness: There is abdominal tenderness in the right lower quadrant and left lower quadrant. There is guarding and rebound. There is no right CVA tenderness or left CVA tenderness.   Musculoskeletal:         General: Normal range of motion.      Cervical back: Normal range of motion and neck supple.   Skin:     General: Skin is warm and dry.      Capillary Refill: Capillary refill takes less than 2 seconds.   Neurological:      General: No focal deficit present.      Mental Status: She is alert and oriented to person, place, and time. Mental status is at baseline.   Psychiatric:         Mood and Affect: Mood normal.         Behavior: Behavior normal.         Thought Content: Thought content normal.         Judgment: Judgment normal.       Results for orders placed or performed in visit on 01/12/24   POCT Urinalysis   Result Value Ref Range    POC Color Dk Yellow Negative    POC Appearance Turbid Negative    POC Glucose Negative Negative mg/dL    POC Bilirubin Negative Negative mg/dL    POC Ketones Negative Negative mg/dL    POC Specific Gravity 1.020 <1.005 - >1.030    POC Blood Negative Negative    POC Urine PH 5.5 5.0 - 8.0    POC Protein Negative Negative mg/dL    POC Urobiligen 1.0 Negative (0.2) mg/dL    POC Nitrites Negative Negative    POC Leukocyte Esterase Small Negative   POCT Pregnancy   Result Value Ref Range    POC Urine Pregnancy Test  Negative     Internal Control Positive Positive     Internal Control Negative Negative          Assessment/Plan:   Assessment    1. Right lower quadrant abdominal pain  CT-ABDOMEN-PELVIS WITH    POCT Urinalysis    POCT Pregnancy      2. Nausea and vomiting in adult  ondansetron (Zofran ODT) dispertab 4 mg        CT abdomen pelvis ordered stat for evaluation of appendicitis and acute abdomen.  Zofran was given clinic today for relief of nausea and vomiting.  Differentials discussed with patient.  Urine is unremarkable in clinic.  Due to whether patient would like to have scan performed at [facility].  Order was given and I will call [place] for results.  Patient given strict ER precautions if pain is intolerable.  She verbalizes agreement and understanding to plan of care.

## 2024-02-08 ENCOUNTER — OFFICE VISIT (OUTPATIENT)
Dept: MEDICAL GROUP | Facility: CLINIC | Age: 24
End: 2024-02-08
Payer: COMMERCIAL

## 2024-02-08 VITALS
HEIGHT: 70 IN | BODY MASS INDEX: 26.45 KG/M2 | OXYGEN SATURATION: 97 % | RESPIRATION RATE: 20 BRPM | WEIGHT: 184.75 LBS | SYSTOLIC BLOOD PRESSURE: 130 MMHG | DIASTOLIC BLOOD PRESSURE: 74 MMHG | HEART RATE: 75 BPM | TEMPERATURE: 98.1 F

## 2024-02-08 DIAGNOSIS — R10.13 DYSPEPSIA: ICD-10-CM

## 2024-02-08 PROCEDURE — 3075F SYST BP GE 130 - 139MM HG: CPT | Performed by: PHYSICIAN ASSISTANT

## 2024-02-08 PROCEDURE — 99213 OFFICE O/P EST LOW 20 MIN: CPT | Performed by: PHYSICIAN ASSISTANT

## 2024-02-08 PROCEDURE — 3078F DIAST BP <80 MM HG: CPT | Performed by: PHYSICIAN ASSISTANT

## 2024-02-08 RX ORDER — OMEPRAZOLE 40 MG/1
40 CAPSULE, DELAYED RELEASE ORAL DAILY
Qty: 90 CAPSULE | Refills: 1 | Status: SHIPPED | OUTPATIENT
Start: 2024-02-08 | End: 2024-03-06 | Stop reason: SDUPTHER

## 2024-02-08 ASSESSMENT — PATIENT HEALTH QUESTIONNAIRE - PHQ9: CLINICAL INTERPRETATION OF PHQ2 SCORE: 0

## 2024-02-08 ASSESSMENT — FIBROSIS 4 INDEX: FIB4 SCORE: 0.31

## 2024-02-08 NOTE — PROGRESS NOTES
"cc: Abdominal pain    Subjective:     Nancy Nesbitt is a 23 y.o. female presenting for abdominal pain    Patient presents to the office for abdominal pain.  Patient was seen in the urgent care on 1-12-24.  She was sent to [place] for concern with appendicitis.  Patient states that she has been vomiting in the mornings and coughing.  Patient states that she has been having symptoms of vomiting when she wakes up for 3 years.  It was worse with side pain. She denies a family history of gall bladder issues or h pylori.      Review of systems:  See above.   Denies any symptoms unless previously indicated.        Current Outpatient Medications:     omeprazole (PRILOSEC) 40 MG delayed-release capsule, Take 1 Capsule by mouth every day., Disp: 90 Capsule, Rfl: 1    Allergies, past medical history, past surgical history, family history, social history reviewed and updated    Objective:     Vitals: /74 (BP Location: Left arm, Patient Position: Sitting, BP Cuff Size: Adult)   Pulse 75   Temp 36.7 °C (98.1 °F) (Temporal)   Resp 20   Ht 1.765 m (5' 9.5\")   Wt 83.8 kg (184 lb 11.9 oz)   LMP 01/22/2024 (Approximate)   SpO2 97%   BMI 26.89 kg/m²   General: Alert, pleasant, NAD  EYES:   PERRL, EOMI, no icterus or pallor.  Conjunctivae and lids normal.   HENT:  Normocephalic.  External ears normal.  Neck supple.     Respiratory: Normal respiratory effort.   Abdomen: Not obese  Skin: Warm, dry, no rashes.  Musculoskeletal: Gait is normal.  Moves all extremities well.    Extremities: normal range of motion all extremities.   Neurological: No tremors, sensation grossly intact, CN2-12 intact.  Psych:  Affect/mood is normal, judgement is good, memory is intact, grooming is appropriate.    Assessment/Plan:     Nancy was seen today for side pain.    Diagnoses and all orders for this visit:    Dyspepsia  -     CBC WITH DIFFERENTIAL; Future  -     Comp Metabolic Panel; Future  -     H. PYLORI BREATH TEST  -     TSH WITH " REFLEX TO FT4; Future  -     AMYLASE; Future  -     LIPASE; Future  -     omeprazole (PRILOSEC) 40 MG delayed-release capsule; Take 1 Capsule by mouth every day.      Believe patient is experiencing acid reflux.  Will obtain H. pylori breath test as well as amylase and lipase.  Further lab work ordered.  Once patient has proceeded with breath test, she can begin omeprazole.  We also discussed avoiding triggers such as acidic foods, spicy foods and caffeine.  Patient does indicate these are a large part of her diet.  Follow-up in 4 to 6 weeks, sooner if needed.  If testing is normal, and patient has been on medication with no success or improvement, will consider gallbladder.      Return in about 4 weeks (around 3/7/2024), or if symptoms worsen or fail to improve, for 4-6 weeks meds and labs.    Please note that this dictation was created using voice recognition software. I have made every reasonable attempt to correct obvious errors, but expect that there are errors of grammar and possible content that I did not discover before finalizing note.

## 2024-02-17 ENCOUNTER — HOSPITAL ENCOUNTER (OUTPATIENT)
Dept: LAB | Facility: MEDICAL CENTER | Age: 24
End: 2024-02-17
Attending: PHYSICIAN ASSISTANT
Payer: COMMERCIAL

## 2024-02-17 DIAGNOSIS — R10.13 DYSPEPSIA: ICD-10-CM

## 2024-02-17 LAB
ALBUMIN SERPL BCP-MCNC: 3.9 G/DL (ref 3.2–4.9)
ALBUMIN/GLOB SERPL: 1.1 G/DL
ALP SERPL-CCNC: 65 U/L (ref 30–99)
ALT SERPL-CCNC: 12 U/L (ref 2–50)
AMYLASE SERPL-CCNC: 43 U/L (ref 20–103)
ANION GAP SERPL CALC-SCNC: 12 MMOL/L (ref 7–16)
AST SERPL-CCNC: 20 U/L (ref 12–45)
BASOPHILS # BLD AUTO: 0.5 % (ref 0–1.8)
BASOPHILS # BLD: 0.04 K/UL (ref 0–0.12)
BILIRUB SERPL-MCNC: 0.5 MG/DL (ref 0.1–1.5)
BUN SERPL-MCNC: 14 MG/DL (ref 8–22)
CALCIUM ALBUM COR SERPL-MCNC: 9.3 MG/DL (ref 8.5–10.5)
CALCIUM SERPL-MCNC: 9.2 MG/DL (ref 8.5–10.5)
CHLORIDE SERPL-SCNC: 107 MMOL/L (ref 96–112)
CO2 SERPL-SCNC: 21 MMOL/L (ref 20–33)
CREAT SERPL-MCNC: 0.7 MG/DL (ref 0.5–1.4)
EOSINOPHIL # BLD AUTO: 0.11 K/UL (ref 0–0.51)
EOSINOPHIL NFR BLD: 1.4 % (ref 0–6.9)
ERYTHROCYTE [DISTWIDTH] IN BLOOD BY AUTOMATED COUNT: 40.5 FL (ref 35.9–50)
GFR SERPLBLD CREATININE-BSD FMLA CKD-EPI: 123 ML/MIN/1.73 M 2
GLOBULIN SER CALC-MCNC: 3.4 G/DL (ref 1.9–3.5)
GLUCOSE SERPL-MCNC: 88 MG/DL (ref 65–99)
HCT VFR BLD AUTO: 42.3 % (ref 37–47)
HGB BLD-MCNC: 14.5 G/DL (ref 12–16)
IMM GRANULOCYTES # BLD AUTO: 0.04 K/UL (ref 0–0.11)
IMM GRANULOCYTES NFR BLD AUTO: 0.5 % (ref 0–0.9)
LIPASE SERPL-CCNC: 24 U/L (ref 11–82)
LYMPHOCYTES # BLD AUTO: 2.63 K/UL (ref 1–4.8)
LYMPHOCYTES NFR BLD: 34.4 % (ref 22–41)
MCH RBC QN AUTO: 28.5 PG (ref 27–33)
MCHC RBC AUTO-ENTMCNC: 34.3 G/DL (ref 32.2–35.5)
MCV RBC AUTO: 83.3 FL (ref 81.4–97.8)
MONOCYTES # BLD AUTO: 0.84 K/UL (ref 0–0.85)
MONOCYTES NFR BLD AUTO: 11 % (ref 0–13.4)
NEUTROPHILS # BLD AUTO: 3.98 K/UL (ref 1.82–7.42)
NEUTROPHILS NFR BLD: 52.2 % (ref 44–72)
NRBC # BLD AUTO: 0 K/UL
NRBC BLD-RTO: 0 /100 WBC (ref 0–0.2)
PLATELET # BLD AUTO: 333 K/UL (ref 164–446)
PMV BLD AUTO: 9.1 FL (ref 9–12.9)
POTASSIUM SERPL-SCNC: 4.6 MMOL/L (ref 3.6–5.5)
PROT SERPL-MCNC: 7.3 G/DL (ref 6–8.2)
RBC # BLD AUTO: 5.08 M/UL (ref 4.2–5.4)
SODIUM SERPL-SCNC: 140 MMOL/L (ref 135–145)
TSH SERPL DL<=0.005 MIU/L-ACNC: 0.99 UIU/ML (ref 0.38–5.33)
WBC # BLD AUTO: 7.6 K/UL (ref 4.8–10.8)

## 2024-02-17 PROCEDURE — 84443 ASSAY THYROID STIM HORMONE: CPT

## 2024-02-17 PROCEDURE — 85025 COMPLETE CBC W/AUTO DIFF WBC: CPT

## 2024-02-17 PROCEDURE — 82150 ASSAY OF AMYLASE: CPT

## 2024-02-17 PROCEDURE — 83013 H PYLORI (C-13) BREATH: CPT

## 2024-02-17 PROCEDURE — 83690 ASSAY OF LIPASE: CPT

## 2024-02-17 PROCEDURE — 80053 COMPREHEN METABOLIC PANEL: CPT

## 2024-02-17 PROCEDURE — 36415 COLL VENOUS BLD VENIPUNCTURE: CPT

## 2024-02-19 LAB — UREA BREATH TEST QL: POSITIVE

## 2024-02-20 DIAGNOSIS — A04.8 H. PYLORI INFECTION: ICD-10-CM

## 2024-02-20 RX ORDER — CLARITHROMYCIN 500 MG/1
500 TABLET, COATED ORAL 2 TIMES DAILY
Qty: 28 TABLET | Refills: 0 | Status: SHIPPED | OUTPATIENT
Start: 2024-02-20 | End: 2024-03-05

## 2024-02-20 RX ORDER — AMOXICILLIN 500 MG/1
1000 CAPSULE ORAL 2 TIMES DAILY
Qty: 56 CAPSULE | Refills: 0 | Status: SHIPPED | OUTPATIENT
Start: 2024-02-20 | End: 2024-03-05

## 2024-02-20 RX ORDER — OMEPRAZOLE 20 MG/1
20 CAPSULE, DELAYED RELEASE ORAL 2 TIMES DAILY
Qty: 28 CAPSULE | Refills: 0 | Status: SHIPPED | OUTPATIENT
Start: 2024-02-20 | End: 2024-03-05

## 2024-02-20 NOTE — PROGRESS NOTES
Patient's labs came back positive for H. pylori which is a bacteria in the stomach.  This can be very difficult to treat.  I am sending in 2 antibiotics and she will need to take the omeprazole twice a day for 2 weeks.  The amoxicillin she will need to take 2 pills twice a day for 2 weeks and clarithromycin 1 pill twice a day for 2 weeks.  Once she has finished the course of the medication, we will need to recheck to make sure that this has treated the H. pylori.  I do need for her to keep her appointment with me.  These can be tough antibiotics and can make her nauseated.  If possible I need for her to finish completely to eradicate this bacteria.   Male

## 2024-03-06 ENCOUNTER — OFFICE VISIT (OUTPATIENT)
Dept: MEDICAL GROUP | Facility: CLINIC | Age: 24
End: 2024-03-06
Payer: COMMERCIAL

## 2024-03-06 VITALS
HEIGHT: 69 IN | RESPIRATION RATE: 20 BRPM | DIASTOLIC BLOOD PRESSURE: 78 MMHG | OXYGEN SATURATION: 97 % | SYSTOLIC BLOOD PRESSURE: 122 MMHG | HEART RATE: 91 BPM | WEIGHT: 172.4 LBS | TEMPERATURE: 98.6 F | BODY MASS INDEX: 25.53 KG/M2

## 2024-03-06 DIAGNOSIS — R10.13 DYSPEPSIA: ICD-10-CM

## 2024-03-06 DIAGNOSIS — Z11.59 NEED FOR HEPATITIS C SCREENING TEST: ICD-10-CM

## 2024-03-06 DIAGNOSIS — B96.81 H PYLORI ULCER: ICD-10-CM

## 2024-03-06 DIAGNOSIS — Z11.4 SCREENING FOR HIV (HUMAN IMMUNODEFICIENCY VIRUS): ICD-10-CM

## 2024-03-06 DIAGNOSIS — K27.9 H PYLORI ULCER: ICD-10-CM

## 2024-03-06 PROCEDURE — 99213 OFFICE O/P EST LOW 20 MIN: CPT | Performed by: PHYSICIAN ASSISTANT

## 2024-03-06 PROCEDURE — 3078F DIAST BP <80 MM HG: CPT | Performed by: PHYSICIAN ASSISTANT

## 2024-03-06 PROCEDURE — 3074F SYST BP LT 130 MM HG: CPT | Performed by: PHYSICIAN ASSISTANT

## 2024-03-06 RX ORDER — ONDANSETRON 4 MG/1
TABLET, FILM COATED ORAL
COMMUNITY
Start: 2024-03-04 | End: 2024-03-08

## 2024-03-06 RX ORDER — OMEPRAZOLE 40 MG/1
40 CAPSULE, DELAYED RELEASE ORAL DAILY
Qty: 90 CAPSULE | Refills: 2 | Status: SHIPPED | OUTPATIENT
Start: 2024-03-06

## 2024-03-06 ASSESSMENT — FIBROSIS 4 INDEX: FIB4 SCORE: 0.43

## 2024-03-06 NOTE — PROGRESS NOTES
"cc: H. pylori    Subjective:     Nancy Nesbitt is a 23 y.o. female presenting for H. pylori    Patient presents to the office for H. Pylori.  Patient had lab work done which showed that she was positive for an H. pylori infection.  She states that since she has undergone treatment, she is feeling improved.  However she is starting to have symptoms of acid reflux and would like to stay on the omeprazole.  She is wanting to know if she does need to retest for H. pylori.  She denies any other symptoms or issues at this time.    Review of systems:  See above.   Denies any symptoms unless previously indicated.        Current Outpatient Medications:     omeprazole (PRILOSEC) 40 MG delayed-release capsule, Take 1 Capsule by mouth every day., Disp: 90 Capsule, Rfl: 2    ondansetron (ZOFRAN) 4 MG Tab tablet, , Disp: , Rfl:     Allergies, past medical history, past surgical history, family history, social history reviewed and updated    Objective:     Vitals: /78 (BP Location: Left arm, Patient Position: Sitting, BP Cuff Size: Adult)   Pulse 91   Temp 37 °C (98.6 °F) (Temporal)   Resp 20   Ht 1.74 m (5' 8.5\")   Wt 78.2 kg (172 lb 6.4 oz)   LMP 02/17/2024 (Approximate)   SpO2 97%   BMI 25.83 kg/m²   General: Alert, pleasant, NAD  EYES:   PERRL, EOMI, no icterus or pallor.  Conjunctivae and lids normal.   HENT:  Normocephalic.  External ears normal.   Neck supple.     Respiratory: Normal respiratory effort.   Abdomen: Not obese  Skin: Warm, dry, no rashes.  Musculoskeletal: Gait is normal.  Moves all extremities well.    Extremities: Normal range of motion all extremities.   Neurological: No tremors, sensation grossly intact, CN2-12 intact.  Psych:  Affect/mood is normal, judgement is good, memory is intact, grooming is appropriate.     Latest Reference Range & Units 02/17/24 07:35   WBC 4.8 - 10.8 K/uL 7.6   RBC 4.20 - 5.40 M/uL 5.08   Hemoglobin 12.0 - 16.0 g/dL 14.5   Hematocrit 37.0 - 47.0 % 42.3   MCV " 81.4 - 97.8 fL 83.3   MCH 27.0 - 33.0 pg 28.5   MCHC 32.2 - 35.5 g/dL 34.3   RDW 35.9 - 50.0 fL 40.5   Platelet Count 164 - 446 K/uL 333   MPV 9.0 - 12.9 fL 9.1   Neutrophils-Polys 44.00 - 72.00 % 52.20   Neutrophils (Absolute) 1.82 - 7.42 K/uL 3.98   Lymphocytes 22.00 - 41.00 % 34.40   Lymphs (Absolute) 1.00 - 4.80 K/uL 2.63   Monocytes 0.00 - 13.40 % 11.00   Monos (Absolute) 0.00 - 0.85 K/uL 0.84   Eosinophils 0.00 - 6.90 % 1.40   Eos (Absolute) 0.00 - 0.51 K/uL 0.11   Basophils 0.00 - 1.80 % 0.50   Baso (Absolute) 0.00 - 0.12 K/uL 0.04   Immature Granulocytes 0.00 - 0.90 % 0.50   Immature Granulocytes (abs) 0.00 - 0.11 K/uL 0.04   Nucleated RBC 0.00 - 0.20 /100 WBC 0.00   NRBC (Absolute) K/uL 0.00   Sodium 135 - 145 mmol/L 140   Potassium 3.6 - 5.5 mmol/L 4.6   Chloride 96 - 112 mmol/L 107   Co2 20 - 33 mmol/L 21   Anion Gap 7.0 - 16.0  12.0   Glucose 65 - 99 mg/dL 88   Bun 8 - 22 mg/dL 14   Creatinine 0.50 - 1.40 mg/dL 0.70   GFR (CKD-EPI) >60 mL/min/1.73 m 2 123   Calcium 8.5 - 10.5 mg/dL 9.2   Correct Calcium 8.5 - 10.5 mg/dL 9.3   AST(SGOT) 12 - 45 U/L 20   ALT(SGPT) 2 - 50 U/L 12   Alkaline Phosphatase 30 - 99 U/L 65   Total Bilirubin 0.1 - 1.5 mg/dL 0.5   Albumin 3.2 - 4.9 g/dL 3.9   Total Protein 6.0 - 8.2 g/dL 7.3   Globulin 1.9 - 3.5 g/dL 3.4   A-G Ratio g/dL 1.1   Lipase 11 - 82 U/L 24   Amylase 20 - 103 U/L 43   TSH 0.380 - 5.330 uIU/mL 0.990   H Pylori Breath Test Negative  Positive !   !: Data is abnormal    Assessment/Plan:     Nancy was seen today for lab results and follow-up.    Diagnoses and all orders for this visit:    H pylori ulcer  -     H.PYLORI STOOL ANTIGEN; Future  Dyspepsia  -     omeprazole (PRILOSEC) 40 MG delayed-release capsule; Take 1 Capsule by mouth every day.    Encouraged the patient is feeling improved.  Will refill Prilosec at this time.  As patient is still on Prilosec, will obtain a H. pylori stool antigen test.  If positive, we will need to treat with quadruple therapy.   Patient advised that this can be contagious and if individuals around her are having similar symptoms, they may need to be tested.    Screening for HIV (human immunodeficiency virus)  -     HIV AG/AB COMBO ASSAY SCREENING; Future  Need for hepatitis C screening test  -     HEP C VIRUS ANTIBODY; Future    Routine screenings ordered.        No follow-ups on file.    Please note that this dictation was created using voice recognition software. I have made every reasonable attempt to correct obvious errors, but expect that there are errors of grammar and possible content that I did not discover before finalizing note.

## 2024-03-08 ENCOUNTER — OFFICE VISIT (OUTPATIENT)
Dept: URGENT CARE | Facility: PHYSICIAN GROUP | Age: 24
End: 2024-03-08
Payer: COMMERCIAL

## 2024-03-08 VITALS
WEIGHT: 183 LBS | RESPIRATION RATE: 14 BRPM | HEIGHT: 69 IN | OXYGEN SATURATION: 98 % | DIASTOLIC BLOOD PRESSURE: 70 MMHG | HEART RATE: 96 BPM | SYSTOLIC BLOOD PRESSURE: 124 MMHG | BODY MASS INDEX: 27.11 KG/M2 | TEMPERATURE: 97.7 F

## 2024-03-08 DIAGNOSIS — J01.90 ACUTE BACTERIAL SINUSITIS: ICD-10-CM

## 2024-03-08 DIAGNOSIS — B96.89 ACUTE BACTERIAL SINUSITIS: ICD-10-CM

## 2024-03-08 PROCEDURE — 3078F DIAST BP <80 MM HG: CPT | Performed by: FAMILY MEDICINE

## 2024-03-08 PROCEDURE — 99213 OFFICE O/P EST LOW 20 MIN: CPT | Performed by: FAMILY MEDICINE

## 2024-03-08 PROCEDURE — 3074F SYST BP LT 130 MM HG: CPT | Performed by: FAMILY MEDICINE

## 2024-03-08 RX ORDER — AMOXICILLIN AND CLAVULANATE POTASSIUM 875; 125 MG/1; MG/1
TABLET, FILM COATED ORAL
Qty: 20 TABLET | Refills: 0 | Status: SHIPPED | OUTPATIENT
Start: 2024-03-08 | End: 2024-03-18

## 2024-03-08 RX ORDER — PREDNISONE 20 MG/1
TABLET ORAL
Qty: 5 TABLET | Refills: 0 | Status: SHIPPED | OUTPATIENT
Start: 2024-03-08 | End: 2024-03-13

## 2024-03-08 ASSESSMENT — FIBROSIS 4 INDEX: FIB4 SCORE: 0.43

## 2024-03-08 NOTE — LETTER
March 8, 2024         Patient: Nancy Nesbitt   YOB: 2000   Date of Visit: 3/8/2024           To Whom it May Concern:    Nancy Nesbitt was seen in my clinic on 3/8/2024.     Please excuse from work for 3/8 thru and including 3/10/24 due to medical condition.     If you have any questions or concerns, please don't hesitate to call.        Sincerely,           Willie Cruz M.D.  Electronically Signed

## 2024-03-08 NOTE — PROGRESS NOTES
Chief Complaint:    Chief Complaint   Patient presents with    Fever    Cough     X 5 days, was seen at [facility], was told it was nothing and sent her home    Congestion    Sinus Pain    Headache       History of Present Illness:    Symptoms x 1 week. Has pain in sinus regions, stuffy head, and cough productive of purulent mucus. Initially had sore throat which has resolved. No fever. Seen at [facility] on 3/4/24, she reports Covid, Flu, CXR, and EKG testing were all done, all negative/non-revealing and was told to further observe. Has tolerated Augmentin in the past.      Past Medical History:    History reviewed. No pertinent past medical history.    Past Surgical History:    Past Surgical History:   Procedure Laterality Date    OTHER      oral surgery as a child.    SINUSOTOMIES       Social History:    Social History     Socioeconomic History    Marital status: Single     Spouse name: Not on file    Number of children: Not on file    Years of education: Not on file    Highest education level: Not on file   Occupational History    Not on file   Tobacco Use    Smoking status: Former     Types: Cigarettes    Smokeless tobacco: Current     Types: Chew   Vaping Use    Vaping Use: Never used   Substance and Sexual Activity    Alcohol use: Yes     Comment: very rare    Drug use: Not Currently     Types: Marijuana     Comment: occasional    Sexual activity: Not on file   Other Topics Concern    Not on file   Social History Narrative    Not on file     Social Determinants of Health     Financial Resource Strain: Not on file   Food Insecurity: Not on file   Transportation Needs: Not on file   Physical Activity: Not on file   Stress: Not on file   Social Connections: Not on file   Intimate Partner Violence: Not on file   Housing Stability: Not on file     Family History:    Family History   Problem Relation Age of Onset    Diabetes Mother     Diabetes Father      Medications:    Current Outpatient Medications on File Prior  "to Visit   Medication Sig Dispense Refill    omeprazole (PRILOSEC) 40 MG delayed-release capsule Take 1 Capsule by mouth every day. 90 Capsule 2     No current facility-administered medications on file prior to visit.     Allergies:    Allergies   Allergen Reactions    Latex      Hives        Vitals:    Vitals:    24 1134   BP: 124/70   Pulse: 96   Resp: 14   Temp: 36.5 °C (97.7 °F)   TempSrc: Temporal   SpO2: 98%   Weight: 83 kg (183 lb)   Height: 1.753 m (5' 9\")       Physical Exam:    Constitutional: Vital signs reviewed. Appears well-developed and well-nourished. Frequent cough.  Eyes: Sclera white, conjunctivae clear.   ENT: Markedly TTP bilateral maxillary sinus regions. External ears normal. External auditory canals normal without discharge. TMs translucent and non-bulging. Hearing normal. Lips/teeth are normal. Oral mucosa pink and moist. Posterior pharynx: mild irritated appearance.  Neck: Neck supple.   Cardiovascular: Regular rate and rhythm. No murmur.  Pulmonary/Chest: Respirations non-labored. Clear to auscultation bilaterally.  Musculoskeletal: Normal gait. No muscular atrophy or weakness.  Neurological: Alert and oriented to person, place, and time. Muscle tone normal. Coordination normal.   Skin: No rashes or lesions. Warm, dry, normal turgor.  Psychiatric: Normal mood and affect. Behavior is normal. Judgment and thought content normal.       Assessment / Plan & Medical Decision Makin. Acute bacterial sinusitis  - amoxicillin-clavulanate (AUGMENTIN) 875-125 MG Tab; 1 TAB BY MOUTH TWICE A DAY X 10 DAYS. TAKE WITH FOOD.  Dispense: 20 Tablet; Refill: 0  - predniSONE (DELTASONE) 20 MG Tab; 1 TAB BY MOUTH ONCE A DAY X 5 DAYS. TAKE WITH FOOD.  Dispense: 5 Tablet; Refill: 0       Work note given - excuse for 3/8 thru and including 3/10/24.    Discussed with her DDX, management options, and risks, benefits, and alternatives to treatment plan agreed upon.    Symptoms x 1 week. Has pain in sinus " regions, stuffy head, and cough productive of purulent mucus. Initially had sore throat which has resolved. No fever. Seen at [facility] on 3/4/24, she reports Covid, Flu, CXR, and EKG testing were all done, all negative/non-revealing and was told to further observe. Has tolerated Augmentin in the past.    Frequent cough. Markedly TTP bilateral maxillary sinus regions. Posterior pharynx: mild irritated appearance.    May use over-the-counter meds for symptoms as needed.     Agreeable to medications prescribed.    Discussed expected course of duration, time for improvement, and to seek follow-up in Emergency Room, urgent care, or with PCP if getting worse at any time or not improving within expected time frame.

## 2025-04-18 ENCOUNTER — NON-PROVIDER VISIT (OUTPATIENT)
Dept: URGENT CARE | Facility: PHYSICIAN GROUP | Age: 25
End: 2025-04-18

## 2025-04-18 DIAGNOSIS — Z02.1 PRE-EMPLOYMENT DRUG SCREENING: ICD-10-CM

## 2025-04-18 PROCEDURE — 8907 PR URINE COLLECT ONLY: Performed by: FAMILY MEDICINE

## 2025-06-11 ENCOUNTER — HOSPITAL ENCOUNTER (OUTPATIENT)
Dept: RADIOLOGY | Facility: MEDICAL CENTER | Age: 25
End: 2025-06-11
Attending: ORTHOPAEDIC SURGERY
Payer: COMMERCIAL

## 2025-06-11 DIAGNOSIS — S83.212A BUCKET-HANDLE TEAR OF MEDIAL MENISCUS, CURRENT INJURY, LEFT KNEE, INITIAL ENCOUNTER: ICD-10-CM

## 2025-06-11 DIAGNOSIS — S89.92XA: ICD-10-CM

## 2025-06-11 PROCEDURE — 73721 MRI JNT OF LWR EXTRE W/O DYE: CPT | Mod: LT
